# Patient Record
Sex: FEMALE | Race: WHITE | NOT HISPANIC OR LATINO | Employment: FULL TIME | URBAN - METROPOLITAN AREA
[De-identification: names, ages, dates, MRNs, and addresses within clinical notes are randomized per-mention and may not be internally consistent; named-entity substitution may affect disease eponyms.]

---

## 2017-02-10 DIAGNOSIS — G43.709 CHRONIC MIGRAINE WITHOUT AURA WITHOUT STATUS MIGRAINOSUS, NOT INTRACTABLE: ICD-10-CM

## 2017-02-10 RX ORDER — SUMATRIPTAN 50 MG/1
50 TABLET, FILM COATED ORAL
Qty: 9 TAB | Refills: 3 | Status: SHIPPED | OUTPATIENT
Start: 2017-02-10 | End: 2017-10-26 | Stop reason: SDUPTHER

## 2017-02-10 RX ORDER — SUMATRIPTAN 50 MG/1
TABLET, FILM COATED ORAL
Qty: 9 TAB | Refills: 0 | OUTPATIENT
Start: 2017-02-10

## 2017-02-10 NOTE — TELEPHONE ENCOUNTER
Please inform patient that Imitrex was refilled for her today. I have never seen the patient before she needs to establish care with PCP to continue management of prescription.    Thanks!    Georgie Ashley M.D.

## 2017-02-10 NOTE — Clinical Note
February 15, 2017        Lilian Nielson  [address] [place] [address]        Dear Lilian:    Your medication, Imitrex was refilled for you today. Dr. Ashley has never seen you before she would like you to establish care with PCP to continue management of prescription.   Thanks!            If you have any questions or concerns, please don't hesitate to call.        Sincerely,        Amanuel Watts Ass't      Electronically Signed

## 2017-02-10 NOTE — TELEPHONE ENCOUNTER
Was the patient seen in the last year in this department? No     Does patient have an active prescription for medications requested? No     Received Request Via: Pharmacy

## 2017-02-13 NOTE — TELEPHONE ENCOUNTER
Phone Number Called: [phone] (home)     Message: Left message for the patient to call us back regarding the note below.    Left Message for patient to call back: yes

## 2017-02-14 NOTE — TELEPHONE ENCOUNTER
Phone Number Called: [phone] (home)     Message: Left message for the patient to call us back regarding the note below.    Left Message for patient to call back: yes

## 2017-02-15 NOTE — TELEPHONE ENCOUNTER
Phone Number Called: [phone] (home)     Message: Left message for the patient to call us back regarding the note below. Letter mailed out to the patient.    Left Message for patient to call back: yes

## 2017-08-09 ENCOUNTER — HOSPITAL ENCOUNTER (OUTPATIENT)
Facility: MEDICAL CENTER | Age: 47
End: 2017-08-09
Payer: COMMERCIAL

## 2017-08-09 LAB
BDY FAT % MEASURED: 15.5 %
BP DIAS: 62 MMHG
BP SYS: 100 MMHG
CHOLEST SERPL-MCNC: 167 MG/DL (ref 100–199)
DIABETES HTDIA: NO
EVENT NAME HTEVT: NORMAL
FASTING HTFAS: YES
GLUCOSE SERPL-MCNC: 71 MG/DL (ref 65–99)
HDLC SERPL-MCNC: 75 MG/DL
HYPERTENSION HTHYP: NO
LDLC SERPL CALC-MCNC: 80 MG/DL
SCREENING LOC CITY HTCIT: NORMAL
SCREENING LOC STATE HTSTA: NORMAL
SCREENING LOCATION HTLOC: NORMAL
SMOKING HTSMO: NO
SUBSCRIBER ID HTSID: NORMAL
TRIGL SERPL-MCNC: 62 MG/DL (ref 0–149)

## 2017-08-09 PROCEDURE — 80061 LIPID PANEL: CPT

## 2017-08-09 PROCEDURE — S5190 WELLNESS ASSESSMENT BY NONPH: HCPCS

## 2017-08-09 PROCEDURE — 82947 ASSAY GLUCOSE BLOOD QUANT: CPT

## 2017-09-18 DIAGNOSIS — F33.42 MAJOR DEPRESSIVE DISORDER, RECURRENT, IN FULL REMISSION (HCC): ICD-10-CM

## 2017-09-19 RX ORDER — FLUOXETINE HYDROCHLORIDE 40 MG/1
CAPSULE ORAL
Qty: 90 CAP | Refills: 0 | OUTPATIENT
Start: 2017-09-19

## 2017-09-19 NOTE — TELEPHONE ENCOUNTER
I called the Pharmacy [facility] advising pharmacist that patient is not under the care of Dr Ashley and to make sure prescription was denied, I called patient at [phone], advising her about the prescription being denied and she requested an appointment with Dr Ashley so I scheduled her.

## 2017-09-19 NOTE — TELEPHONE ENCOUNTER
I have never been seen this patient before. Patient needs to follow with her PCP or establish care with new PCP for medication refill. Refill declined.    Thanks!  Georgie Ashley M.D.

## 2017-10-07 ENCOUNTER — IMMUNIZATION (OUTPATIENT)
Dept: OCCUPATIONAL MEDICINE | Facility: CLINIC | Age: 47
End: 2017-10-07

## 2017-10-07 DIAGNOSIS — Z23 NEED FOR VACCINATION: ICD-10-CM

## 2017-10-07 PROCEDURE — 90686 IIV4 VACC NO PRSV 0.5 ML IM: CPT | Performed by: PREVENTIVE MEDICINE

## 2017-10-26 ENCOUNTER — OFFICE VISIT (OUTPATIENT)
Dept: MEDICAL GROUP | Age: 47
End: 2017-10-26
Payer: COMMERCIAL

## 2017-10-26 VITALS
WEIGHT: 100.4 LBS | BODY MASS INDEX: 14.87 KG/M2 | DIASTOLIC BLOOD PRESSURE: 74 MMHG | HEIGHT: 69 IN | TEMPERATURE: 98.2 F | HEART RATE: 100 BPM | SYSTOLIC BLOOD PRESSURE: 98 MMHG | OXYGEN SATURATION: 99 %

## 2017-10-26 DIAGNOSIS — F33.42 MAJOR DEPRESSIVE DISORDER, RECURRENT, IN FULL REMISSION (HCC): ICD-10-CM

## 2017-10-26 DIAGNOSIS — Z12.31 VISIT FOR SCREENING MAMMOGRAM: ICD-10-CM

## 2017-10-26 DIAGNOSIS — G43.709 CHRONIC MIGRAINE WITHOUT AURA WITHOUT STATUS MIGRAINOSUS, NOT INTRACTABLE: ICD-10-CM

## 2017-10-26 DIAGNOSIS — M62.838 NECK MUSCLE SPASM: ICD-10-CM

## 2017-10-26 PROCEDURE — 99214 OFFICE O/P EST MOD 30 MIN: CPT | Performed by: INTERNAL MEDICINE

## 2017-10-26 RX ORDER — SUMATRIPTAN 50 MG/1
50 TABLET, FILM COATED ORAL
Qty: 10 TAB | Refills: 3 | Status: SHIPPED | OUTPATIENT
Start: 2017-10-26 | End: 2018-07-22 | Stop reason: SDUPTHER

## 2017-10-26 RX ORDER — FLUOXETINE HYDROCHLORIDE 40 MG/1
40 CAPSULE ORAL
Qty: 90 CAP | Refills: 3 | Status: SHIPPED | OUTPATIENT
Start: 2017-10-26 | End: 2018-10-18 | Stop reason: SDUPTHER

## 2017-10-26 ASSESSMENT — PAIN SCALES - GENERAL: PAINLEVEL: NO PAIN

## 2017-10-27 NOTE — ASSESSMENT & PLAN NOTE
Patient has migraine since teenage. She has nausea and has sense of bad smell when she has migraine. She has migraine once or twice a month, which is well controlled with sumatriptan one dose or 2 doses the most. She denies side effects from taking sumatriptan. She needs to refill medication.

## 2017-10-27 NOTE — ASSESSMENT & PLAN NOTE
Patient stated that she was diagnosed with depression for many years. She was treated with Prozac for about 5 years. She tried to stop Prozac and had recurrent symptoms. She is taking Prozac 40 mg every evening. She denies side effects from taking Prozac and denies suicidal ideation or plan. She wants to refill Prozac today.

## 2017-10-27 NOTE — ASSESSMENT & PLAN NOTE
Patient reported that she sometimes has tension headache and headache and radiated to back of the neck and shoulders. Prior PCP prescribed Robaxin and Motrin for her to take for muscle spasm and tension headache. She does not require to take Robaxin a lot. She still has Robaxin at home. She needs to use computer a lot at work.

## 2017-10-27 NOTE — PROGRESS NOTES
Lilian Nielson is a 46 y.o. female here to establish care and the evaluation and management of:      HPI:    Major depressive disorder, recurrent, in full remission  Patient stated that she was diagnosed with depression for many years. She was treated with Prozac for about 5 years. She tried to stop Prozac and had recurrent symptoms. She is taking Prozac 40 mg every evening. She denies side effects from taking Prozac and denies suicidal ideation or plan. She wants to refill Prozac today.    Chronic migraine without aura without status migrainosus, not intractable  Patient has migraine since teenage. She has nausea and has sense of bad smell when she has migraine. She has migraine once or twice a month, which is well controlled with sumatriptan one dose or 2 doses the most. She denies side effects from taking sumatriptan. She needs to refill medication.    Neck muscle spasm  Patient reported that she sometimes has tension headache and headache and radiated to back of the neck and shoulders. Prior PCP prescribed Robaxin and Motrin for her to take for muscle spasm and tension headache. She does not require to take Robaxin a lot. She still has Robaxin at home. She needs to use computer a lot at work.    Current medicines (including changes today)  Current Outpatient Prescriptions   Medication Sig Dispense Refill   • fluoxetine (PROZAC) 40 MG capsule Take 1 Cap by mouth every day. 90 Cap 3   • sumatriptan (IMITREX) 50 MG Tab Take 1 Tab by mouth Once PRN for Migraine for up to 1 dose. 10 Tab 3   • methocarbamol (ROBAXIN) 500 MG Tab Take 1 Tab by mouth 3 times a day. Tie prn muscle tension. 90 Tab 0     No current facility-administered medications for this visit.      She  has a past medical history of Amenorrhea and Migraine (2011). She also has no past medical history of Breast cancer (CMS-[facility]) or Cancer (CMS-[facility]).  She  has a past surgical history that includes tonsillectomy (1971).  Social History   Substance Use  "Topics   • Smoking status: Never Smoker   • Smokeless tobacco: Never Used   • Alcohol use No     Social History     Social History Narrative   • No narrative on file     Family History   Problem Relation Age of Onset   • Cancer Mother      breast/  2009   • Diabetes Mother    • Heart Disease Father    • Heart Disease Maternal Grandmother    • Heart Disease Maternal Grandfather      Family Status   Relation Status   • Mother Alive    depression, migraine, obesity   • Brother Alive    severe allergies   • Father Alive   • Maternal Grandmother    • Maternal Grandfather    • Paternal Grandmother    • Paternal Grandfather      Health Maintenance Topics with due status: Overdue       Topic Date Due    MAMMOGRAM 2014         ROS    Gen.: Denied weight change, appetite change, fatigue.  ENT: Denied sinus tenderness, nasal congestion, runny nose, or sore throat  CVS: Denied chest pain, palpitations, legs swelling.  Respiratory: Denied cough, shortness of breath, wheezing.  GI: Denied abdominal pain, constipation or diarrhea.  Endocrine: Denied temperature intolerance, increased frequency of urination, polyphagia or polydipsia.  Musculoskeletal: Denied back pain or joint pain.    All other systems reviewed and are negative     Objective:     Blood pressure (!) 98/74, pulse 100, temperature 36.8 °C (98.2 °F), height 1.753 m (5' 9\"), weight 45.5 kg (100 lb 6.4 oz), SpO2 99 %, not currently breastfeeding. Body mass index is 14.83 kg/m².  Physical Exam:    Constitutional: Well nourished and Well developed, Alert, no distress.  Skin: Warm, dry, good turgor, no rashes in visible areas.  Eye: Equal, round and reactive, conjunctiva clear, lids normal.  ENMT: Lips without lesions, good dentition, oropharynx clear.  Neck: Trachea midline, no masses, no thyromegaly. No cervical or supraclavicular lymphadenopathy.  Respiratory: Unlabored respiratory effort, lungs clear to auscultation, no wheezes, no " scottyoscar.  Cardiovascular: Normal S1, S2, no murmur, no edema.  Abdomen: Soft, non distended, non-tender, no masses, no hepatosplenomegaly. Bowel sound normal.  Extremities: No edema, no clubbing, no cyanosis.  Psych: Alert and oriented x3, normal affect and mood.      Assessment and Plan:   The following treatment plan was discussed       1. Major depressive disorder, recurrent, in full remission (CMS-HCC)  - Well-controlled. Continue Prozac 40 mg daily. We discussed potentially decrease the dose of Prozac in future. Patient wants to keep present with the same dose currently. Refill medication today.  - Discussed with patient regarding the use and side effects of medication as well as black box warning of suicidality risk with medication. Patient verbally understands. Recommend to call 911 or go to ER if patient has suicidal ideation or plan.   - fluoxetine (PROZAC) 40 MG capsule; Take 1 Cap by mouth every day.  Dispense: 90 Cap; Refill: 3  - CBC WITH DIFFERENTIAL; Future  - COMP METABOLIC PANEL; Future    2. Chronic migraine without aura without status migrainosus, not intractable  - Well-controlled. Continue Imitrex when necessary. Review potential side effects of Imitrex with patient.  - sumatriptan (IMITREX) 50 MG Tab; Take 1 Tab by mouth Once PRN for Migraine for up to 1 dose.  Dispense: 10 Tab; Refill: 3  - CBC WITH DIFFERENTIAL; Future  - COMP METABOLIC PANEL; Future    3. Neck muscle spasm  - Discussed to do stretching neck exercise regularly and try heating pad treatment.   - Recommend to use Robaxin rarely. Review potential side effects of Robaxin with patient.    4. Visit for screening mammogram  - Patient is overdue for mammogram. Ordered screening mammogram  - MA-SCREEN MAMMO W/CAD-BILAT; Future    5. Hypotension  - Asymptomatic. Patient stated that her blood pressure has been low all the time. She does not feel any tired, fatigue or dizzy. Advised to keep hydrated well and monitor blood pressure and  pulse at home.    Records requested.  Followup: Return in about 6 months (around 4/26/2018), or if symptoms worsen or fail to improve, for annual physical exam. sooner should new symptoms or problems arise.      Please note that this dictation was created using voice recognition software. I have made every reasonable attempt to correct obvious errors, but I expect that there may have unintended errors in text, spelling, punctuation, or grammar that I did not discover.

## 2018-02-06 ENCOUNTER — APPOINTMENT (OUTPATIENT)
Dept: RADIOLOGY | Facility: MEDICAL CENTER | Age: 48
End: 2018-02-06
Attending: INTERNAL MEDICINE
Payer: COMMERCIAL

## 2018-02-07 ENCOUNTER — HOSPITAL ENCOUNTER (OUTPATIENT)
Dept: RADIOLOGY | Facility: MEDICAL CENTER | Age: 48
End: 2018-02-07
Attending: INTERNAL MEDICINE
Payer: COMMERCIAL

## 2018-02-07 DIAGNOSIS — Z12.31 VISIT FOR SCREENING MAMMOGRAM: ICD-10-CM

## 2018-02-07 PROCEDURE — 77067 SCR MAMMO BI INCL CAD: CPT

## 2018-04-07 ENCOUNTER — HOSPITAL ENCOUNTER (OUTPATIENT)
Dept: LAB | Facility: MEDICAL CENTER | Age: 48
End: 2018-04-07
Payer: COMMERCIAL

## 2018-04-07 ENCOUNTER — TELEPHONE (OUTPATIENT)
Dept: MEDICAL GROUP | Facility: MEDICAL CENTER | Age: 48
End: 2018-04-07

## 2018-04-07 ENCOUNTER — HOSPITAL ENCOUNTER (OUTPATIENT)
Dept: LAB | Facility: MEDICAL CENTER | Age: 48
End: 2018-04-07
Attending: INTERNAL MEDICINE
Payer: COMMERCIAL

## 2018-04-07 DIAGNOSIS — F33.42 MAJOR DEPRESSIVE DISORDER, RECURRENT, IN FULL REMISSION (HCC): ICD-10-CM

## 2018-04-07 DIAGNOSIS — G43.709 CHRONIC MIGRAINE WITHOUT AURA WITHOUT STATUS MIGRAINOSUS, NOT INTRACTABLE: ICD-10-CM

## 2018-04-07 DIAGNOSIS — E87.6 HYPOKALEMIA: ICD-10-CM

## 2018-04-07 LAB
ALBUMIN SERPL BCP-MCNC: 4.1 G/DL (ref 3.2–4.9)
ALBUMIN/GLOB SERPL: 1.6 G/DL
ALP SERPL-CCNC: 52 U/L (ref 30–99)
ALT SERPL-CCNC: 16 U/L (ref 2–50)
ANION GAP SERPL CALC-SCNC: 10 MMOL/L (ref 0–11.9)
AST SERPL-CCNC: 23 U/L (ref 12–45)
BASOPHILS # BLD AUTO: 2.4 % (ref 0–1.8)
BASOPHILS # BLD: 0.16 K/UL (ref 0–0.12)
BDY FAT % MEASURED: 13.5 %
BILIRUB SERPL-MCNC: 0.2 MG/DL (ref 0.1–1.5)
BP DIAS: 46 MMHG
BP SYS: 93 MMHG
BUN SERPL-MCNC: 30 MG/DL (ref 8–22)
CALCIUM SERPL-MCNC: 9 MG/DL (ref 8.5–10.5)
CHLORIDE SERPL-SCNC: 103 MMOL/L (ref 96–112)
CHOLEST SERPL-MCNC: 202 MG/DL (ref 100–199)
CO2 SERPL-SCNC: 28 MMOL/L (ref 20–33)
CREAT SERPL-MCNC: 1.75 MG/DL (ref 0.5–1.4)
DIABETES HTDIA: NO
EOSINOPHIL # BLD AUTO: 1.09 K/UL (ref 0–0.51)
EOSINOPHIL NFR BLD: 16.4 % (ref 0–6.9)
ERYTHROCYTE [DISTWIDTH] IN BLOOD BY AUTOMATED COUNT: 49.7 FL (ref 35.9–50)
EVENT NAME HTEVT: NORMAL
FASTING HTFAS: YES
GLOBULIN SER CALC-MCNC: 2.5 G/DL (ref 1.9–3.5)
GLUCOSE SERPL-MCNC: 81 MG/DL (ref 65–99)
GLUCOSE SERPL-MCNC: 83 MG/DL (ref 65–99)
HCT VFR BLD AUTO: 38.7 % (ref 37–47)
HDLC SERPL-MCNC: 81 MG/DL
HGB BLD-MCNC: 11.9 G/DL (ref 12–16)
HYPERTENSION HTHYP: NO
IMM GRANULOCYTES # BLD AUTO: 0.02 K/UL (ref 0–0.11)
IMM GRANULOCYTES NFR BLD AUTO: 0.3 % (ref 0–0.9)
LDLC SERPL CALC-MCNC: 106 MG/DL
LYMPHOCYTES # BLD AUTO: 1.25 K/UL (ref 1–4.8)
LYMPHOCYTES NFR BLD: 18.9 % (ref 22–41)
MCH RBC QN AUTO: 25.6 PG (ref 27–33)
MCHC RBC AUTO-ENTMCNC: 30.7 G/DL (ref 33.6–35)
MCV RBC AUTO: 83.4 FL (ref 81.4–97.8)
MONOCYTES # BLD AUTO: 0.42 K/UL (ref 0–0.85)
MONOCYTES NFR BLD AUTO: 6.3 % (ref 0–13.4)
NEUTROPHILS # BLD AUTO: 3.69 K/UL (ref 2–7.15)
NEUTROPHILS NFR BLD: 55.7 % (ref 44–72)
NRBC # BLD AUTO: 0 K/UL
NRBC BLD-RTO: 0 /100 WBC
PLATELET # BLD AUTO: 389 K/UL (ref 164–446)
PMV BLD AUTO: 8.9 FL (ref 9–12.9)
POTASSIUM SERPL-SCNC: 2.4 MMOL/L (ref 3.6–5.5)
PROT SERPL-MCNC: 6.6 G/DL (ref 6–8.2)
RBC # BLD AUTO: 4.64 M/UL (ref 4.2–5.4)
SCREENING LOC CITY HTCIT: NORMAL
SCREENING LOC STATE HTSTA: NORMAL
SCREENING LOCATION HTLOC: NORMAL
SMOKING HTSMO: NO
SODIUM SERPL-SCNC: 141 MMOL/L (ref 135–145)
SUBSCRIBER ID HTSID: NORMAL
TRIGL SERPL-MCNC: 73 MG/DL (ref 0–149)
WBC # BLD AUTO: 6.6 K/UL (ref 4.8–10.8)

## 2018-04-07 PROCEDURE — 80061 LIPID PANEL: CPT

## 2018-04-07 PROCEDURE — 85025 COMPLETE CBC W/AUTO DIFF WBC: CPT

## 2018-04-07 PROCEDURE — 36415 COLL VENOUS BLD VENIPUNCTURE: CPT

## 2018-04-07 PROCEDURE — 82947 ASSAY GLUCOSE BLOOD QUANT: CPT

## 2018-04-07 PROCEDURE — 80053 COMPREHEN METABOLIC PANEL: CPT

## 2018-04-07 PROCEDURE — S5190 WELLNESS ASSESSMENT BY NONPH: HCPCS

## 2018-04-07 RX ORDER — POTASSIUM CHLORIDE 20 MEQ/1
TABLET, EXTENDED RELEASE ORAL
Qty: 30 TAB | Refills: 0 | Status: SHIPPED | OUTPATIENT
Start: 2018-04-07 | End: 2018-04-26 | Stop reason: SDUPTHER

## 2018-04-08 NOTE — TELEPHONE ENCOUNTER
Please call and inform patient to take potassium supplement 20 meq 3 times a day as prescribed by Dr. Chappell on 4/7/18 who did cross-covering for pager.   Please advise patient to drink more water about 60 ounces a day and avoid taking NSAIDs such as ibuprofen, aleve, advil, naproxen, motrin and avoid alcohol as her kidney function is decreasing.   Please advise to eat potassium rich diet such as bananas, potatoes, squash, oranges.     I ordered basal metabolic panel to recheck potassium and kidney function for patient to follow up on potassium level and kidney function. Please advise her to do fasting lab on 4/20/18 so that I can review her blood test with her in her upcoming appointment on 4/26/18.    Thanks!  Georgie Ashley M.D.

## 2018-04-08 NOTE — TELEPHONE ENCOUNTER
Patient is called for her low potassium ( 2.4). First call at home no response. Second call at her cell phone no response.. Third call at home phone patient responded. Patient denies any symptoms ( no muscle spasm, but she has been feeling a little bit weak and  tired , but no palpitation, or SOB) , no previous K level to compare. I informed her that, the K level is critically low and need a fast replacement . Advised to go to Er for IV Potassium. Will follow up with potassium level, after discharge. Advised to follow up with PCP for evaluation the cause of her hypokalemia, and CKD ( eGFR 31, Cr 1.75). Will route this note to PCP.

## 2018-04-09 NOTE — TELEPHONE ENCOUNTER
Phone Number Called: [phone] (cell)  [phone] (home)         Left Message for patient to call back: yes

## 2018-04-10 NOTE — TELEPHONE ENCOUNTER
1. Caller Name: Lilian Nielson                                           Call Back Number: [phone] (home)       Reviewed note below with pt. She agrees that she understands all drs orders,  Asks what should she take if she gets a migraine, as she is currently controlling them with NSAIDS?        Patient approves a detailed voicemail message: yes

## 2018-04-10 NOTE — TELEPHONE ENCOUNTER
Please advise to take Tylenol for headache. She can take Imitrex 50 mg one tablet one time as needed for migraine if headache does not improve with Tylenol. We can discuss migraine in her follow-up appointment on 4/26/18.    She has to completely stopped taking ibuprofen or any NSAIDs.    Thanks!  Georgie Ashley M.D.

## 2018-04-10 NOTE — TELEPHONE ENCOUNTER
1. Caller Name: Lilian Nielson                                           Call Back Number: [phone] (home)      Pt informed  And understands,          Patient approves a detailed voicemail message: N\A

## 2018-04-19 ENCOUNTER — HOSPITAL ENCOUNTER (OUTPATIENT)
Dept: LAB | Facility: MEDICAL CENTER | Age: 48
End: 2018-04-19
Attending: INTERNAL MEDICINE
Payer: COMMERCIAL

## 2018-04-19 ENCOUNTER — TELEPHONE (OUTPATIENT)
Dept: MEDICAL GROUP | Age: 48
End: 2018-04-19

## 2018-04-19 DIAGNOSIS — E87.6 HYPOKALEMIA: ICD-10-CM

## 2018-04-19 DIAGNOSIS — N28.9 RENAL IMPAIRMENT: ICD-10-CM

## 2018-04-19 DIAGNOSIS — D64.9 NORMOCYTIC ANEMIA: ICD-10-CM

## 2018-04-19 LAB
ALBUMIN SERPL BCP-MCNC: 4 G/DL (ref 3.2–4.9)
ALBUMIN/GLOB SERPL: 1.5 G/DL
ALP SERPL-CCNC: 49 U/L (ref 30–99)
ALT SERPL-CCNC: 16 U/L (ref 2–50)
ANION GAP SERPL CALC-SCNC: 11 MMOL/L (ref 0–11.9)
AST SERPL-CCNC: 22 U/L (ref 12–45)
BILIRUB SERPL-MCNC: 0.2 MG/DL (ref 0.1–1.5)
BUN SERPL-MCNC: 24 MG/DL (ref 8–22)
CALCIUM SERPL-MCNC: 8.9 MG/DL (ref 8.5–10.5)
CHLORIDE SERPL-SCNC: 102 MMOL/L (ref 96–112)
CO2 SERPL-SCNC: 28 MMOL/L (ref 20–33)
CREAT SERPL-MCNC: 1.57 MG/DL (ref 0.5–1.4)
FERRITIN SERPL-MCNC: 6 NG/ML (ref 10–291)
FOLATE SERPL-MCNC: >24 NG/ML
GLOBULIN SER CALC-MCNC: 2.7 G/DL (ref 1.9–3.5)
GLUCOSE SERPL-MCNC: 81 MG/DL (ref 65–99)
IRON SATN MFR SERPL: 5 % (ref 15–55)
IRON SERPL-MCNC: 25 UG/DL (ref 40–170)
POTASSIUM SERPL-SCNC: 2.3 MMOL/L (ref 3.6–5.5)
PROT SERPL-MCNC: 6.7 G/DL (ref 6–8.2)
SODIUM SERPL-SCNC: 141 MMOL/L (ref 135–145)
T4 FREE SERPL-MCNC: 0.63 NG/DL (ref 0.53–1.43)
TIBC SERPL-MCNC: 468 UG/DL (ref 250–450)
TSH SERPL DL<=0.005 MIU/L-ACNC: 1.97 UIU/ML (ref 0.38–5.33)
VIT B12 SERPL-MCNC: 640 PG/ML (ref 211–911)

## 2018-04-19 PROCEDURE — 83540 ASSAY OF IRON: CPT

## 2018-04-19 PROCEDURE — 82607 VITAMIN B-12: CPT

## 2018-04-19 PROCEDURE — 83550 IRON BINDING TEST: CPT

## 2018-04-19 PROCEDURE — 82728 ASSAY OF FERRITIN: CPT

## 2018-04-19 PROCEDURE — 84439 ASSAY OF FREE THYROXINE: CPT

## 2018-04-19 PROCEDURE — 82746 ASSAY OF FOLIC ACID SERUM: CPT

## 2018-04-19 PROCEDURE — 36415 COLL VENOUS BLD VENIPUNCTURE: CPT

## 2018-04-19 PROCEDURE — 80053 COMPREHEN METABOLIC PANEL: CPT

## 2018-04-19 PROCEDURE — 84443 ASSAY THYROID STIM HORMONE: CPT

## 2018-04-19 NOTE — TELEPHONE ENCOUNTER
I spoke with patient over the phone regarding her critical low potassium at 2.3. Patient has potassium 20 mEq to take 3 times a day. She stated that she did not take her potassium as instructed. She only took potassium, 1-2 days and she did not continue that. Patient was started taking potassium 20 mEq 3 times a day with meal. We also discussed to eat potassium rich diet. She has impaired kidney function. Patient stated that she is taking ibuprofen 600 mg twice a day for many years. I advised patient to completely stop all NSAIDs including ibuprofen. She is advised to take Tylenol only for headache or joint pain or muscle pain. Patient is advised to increase water intake up to 40-60 ounces a day.    She has low ferritin and slightly anemic. Patient stated that she has irregular menstrual period. She has menstruation every other month, last for 7-10 days and has moderate to heavy flow. She was told that she has iron deficiency in the past and was advised to take iron supplement. She did not take iron supplement for couple years. She denied blood in stool or abdominal pain or diarrhea or nausea or vomiting.    I advised patient to start ferrous sulfate 325 mg one tablet twice a day with meal. I also reviewed potential side effects of iron supplements. I advised patient to increase fiber intake and water intake to prevent constipation. She can try stool softener from over-the-counter if she has constipation.    Patient has follow-up appointment with me on 4/26/18.    Thanks!  Georgie Ashley M.D.

## 2018-04-19 NOTE — TELEPHONE ENCOUNTER
1. Caller Name: Tricia Martinez lab at [facility]                                         Call Back Number: N/A        Critical Results: Potassium 2.3    Main lab at [facility] wants to inform you that patients potassium is 2.3.

## 2018-04-19 NOTE — TELEPHONE ENCOUNTER
Patient presented to clinic to request lab order. Her recent blood tests on 4/7/18 showed she has normocytic anemia, low potassium at 2.4, and impaired kidney function. She is taking potassium 20 mEq one tablet daily instructed by on-call provider, Dr. Chappell. She will need to recheck a potassium level and kidney function.  I order CMP to repeat potassium and kidney function I also order iron panel, B12, folate, thyroid, for anemia workup. Patient is advised not to take ibuprofen, Aleve, Advil or any NSAIDs from over-the-counter.        Georgie Ashley M.D.

## 2018-04-26 ENCOUNTER — OFFICE VISIT (OUTPATIENT)
Dept: MEDICAL GROUP | Age: 48
End: 2018-04-26
Payer: COMMERCIAL

## 2018-04-26 VITALS
RESPIRATION RATE: 16 BRPM | SYSTOLIC BLOOD PRESSURE: 100 MMHG | TEMPERATURE: 97.8 F | OXYGEN SATURATION: 91 % | HEART RATE: 72 BPM | WEIGHT: 100 LBS | BODY MASS INDEX: 14.81 KG/M2 | DIASTOLIC BLOOD PRESSURE: 64 MMHG | HEIGHT: 69 IN

## 2018-04-26 DIAGNOSIS — N18.30 CKD (CHRONIC KIDNEY DISEASE) STAGE 3, GFR 30-59 ML/MIN (HCC): ICD-10-CM

## 2018-04-26 DIAGNOSIS — E87.6 HYPOKALEMIA: ICD-10-CM

## 2018-04-26 DIAGNOSIS — R63.6 UNDERWEIGHT DUE TO INADEQUATE CALORIC INTAKE: ICD-10-CM

## 2018-04-26 DIAGNOSIS — E61.1 IRON DEFICIENCY: ICD-10-CM

## 2018-04-26 DIAGNOSIS — G43.709 CHRONIC MIGRAINE WITHOUT AURA WITHOUT STATUS MIGRAINOSUS, NOT INTRACTABLE: ICD-10-CM

## 2018-04-26 DIAGNOSIS — D64.9 NORMOCYTIC ANEMIA: ICD-10-CM

## 2018-04-26 PROCEDURE — 99215 OFFICE O/P EST HI 40 MIN: CPT | Performed by: INTERNAL MEDICINE

## 2018-04-26 RX ORDER — POTASSIUM CHLORIDE 20 MEQ/1
20 TABLET, EXTENDED RELEASE ORAL 2 TIMES DAILY
Qty: 60 TAB | Refills: 6 | Status: SHIPPED | OUTPATIENT
Start: 2018-04-26 | End: 2019-08-15 | Stop reason: SDUPTHER

## 2018-04-26 ASSESSMENT — PATIENT HEALTH QUESTIONNAIRE - PHQ9: CLINICAL INTERPRETATION OF PHQ2 SCORE: 0

## 2018-04-26 NOTE — ASSESSMENT & PLAN NOTE
Patient states that she does not have severe cerebral headache after stopped taking Excedrin Migraine and ibuprofen. She is still feeling fatigued and tired. We discussed to completely stop NSAIDs and Excedrin Migraine. She can try  1-2 cup regular coffee per day. Patient is advised not to drink any tea due to low iron. Patient will take Imitrex as needed for migraine.

## 2018-04-26 NOTE — ASSESSMENT & PLAN NOTE
Patient did not have complete metabolic panel until April 2018. She was found to have venous vision impairment with elevated creatinine and low GFR. Patient admitted that she depended on caffeine from taking Excedrin migraine medication 2 tablets 3 times a day and ibuprofen 800 mg daily for many years. After she knows about her kidney function impairment, she stopped taking all NSAIDs, Excedrin in the past 2 weeks. She stated that she does not like to drink coffee as she becomes dependence on caffeine from taking Excedrin.

## 2018-04-26 NOTE — ASSESSMENT & PLAN NOTE
Patient stated that she has irregular menstrual cycle when she has menstruation for 7 days with minimal flow or spotting only. She does not likes to eat meat but she is okay to eat chicken. She is okay to eat vegetables.  She drinks a lot of tea throughout the day. Patient is advised to stop drinking tea.. She denied chest pain, palpitation, shortness of breath. However, she feels tired most of the time. CBC on 4/17/18 show hemoglobin 11.9. Her ferritin level was 6. . She has normal B12, folate, thyroid hormone test.    Results for JODEE PRIETO (MRN [id number]) as of 4/26/2018 09:22   Ref. Range 4/19/2018 08:31   Iron Latest Ref Range: 40 - 170 ug/dL 25 (L)   Total Iron Binding Latest Ref Range: 250 - 450 ug/dL 468 (H)   % Saturation Latest Ref Range: 15 - 55 % 5 (L)     Results for JODEE PRIETO (MRN [id number]) as of 4/26/2018 09:22   Ref. Range 4/19/2018 08:31   Vitamin B12 -True Cobalamin Latest Ref Range: 211 - 911 pg/mL 640   Ferritin Latest Ref Range: 10.0 - 291.0 ng/mL 6.0 (L)   Folate -Folic Acid Latest Ref Range: >4.0 ng/mL >24.0   TSH Latest Ref Range: 0.380 - 5.330 uIU/mL 1.970   Free T-4 Latest Ref Range: 0.53 - 1.43 ng/dL 0.63

## 2018-04-26 NOTE — PROGRESS NOTES
Subjective:   Jodee Nielson is a 47 y.o. female here today for evaluation and management of:      CKD (chronic kidney disease) stage 3, GFR 30-59 ml/min  Patient did not have complete metabolic panel until April 2018. She was found to have venous vision impairment with elevated creatinine and low GFR. Patient admitted that she depended on caffeine from taking Excedrin migraine medication 2 tablets 3 times a day and ibuprofen 800 mg daily for many years. After she knows about her kidney function impairment, she stopped taking all NSAIDs, Excedrin in the past 2 weeks. She stated that she does not like to drink coffee as she becomes dependence on caffeine from taking Excedrin.     Chronic migraine without aura without status migrainosus, not intractable  Patient states that she does not have severe cerebral headache after stopped taking Excedrin Migraine and ibuprofen. She is still feeling fatigued and tired. We discussed to completely stop NSAIDs and Excedrin Migraine. She can try  1-2 cup regular coffee per day. Patient is advised not to drink any tea due to low iron. Patient will take Imitrex as needed for migraine.    Iron deficiency  Patient stated that she has irregular menstrual cycle when she has menstruation for 7 days with minimal flow or spotting only. She does not likes to eat meat but she is okay to eat chicken. She is okay to eat vegetables.  She drinks a lot of tea throughout the day. Patient is advised to stop drinking tea.. She denied chest pain, palpitation, shortness of breath. However, she feels tired most of the time. CBC on 4/17/18 show hemoglobin 11.9. Her ferritin level was 6. . She has normal B12, folate, thyroid hormone test.    Results for JODEE NIELSON (MRN [id number]) as of 4/26/2018 09:22   Ref. Range 4/19/2018 08:31   Iron Latest Ref Range: 40 - 170 ug/dL 25 (L)   Total Iron Binding Latest Ref Range: 250 - 450 ug/dL 468 (H)   % Saturation Latest Ref Range: 15 - 55 % 5 (L)     Results for  IDAJODEE (MRN [id number]) as of 4/26/2018 09:22   Ref. Range 4/19/2018 08:31   Vitamin B12 -True Cobalamin Latest Ref Range: 211 - 911 pg/mL 640   Ferritin Latest Ref Range: 10.0 - 291.0 ng/mL 6.0 (L)   Folate -Folic Acid Latest Ref Range: >4.0 ng/mL >24.0   TSH Latest Ref Range: 0.380 - 5.330 uIU/mL 1.970   Free T-4 Latest Ref Range: 0.53 - 1.43 ng/dL 0.63       Underweight due to inadequate caloric intake  Patient has history of anorexia nervosa. She stated that she is not intentionally to avoid eating or induced vomiting. However, she does not like to eat heavy meal or eat meats. She stated that she will have running exercises once or twice a week, about one mile only with her daughter. She is not doing exercises exercise currently. She reported that she has busy schedule at work and has 2 kids, the one in high school and the one in middle school. She is very busy to take care of her two kids including preparing meal, transportation for their school sports and activities. She is not able to eat well when she is busy and she feels anxious and nauseous when she forces herself to eat.         Current medicines (including changes today)  Current Outpatient Prescriptions   Medication Sig Dispense Refill   • Ferrous Fumarate 325 (106 Fe) MG Tab Take 1 Tab by mouth 2 times a day, with meals. 60 Tab 6   • potassium chloride SA (KDUR) 20 MEQ Tab CR Take 1 Tab by mouth 2 times a day. 60 Tab 6   • fluoxetine (PROZAC) 40 MG capsule Take 1 Cap by mouth every day. 90 Cap 3   • sumatriptan (IMITREX) 50 MG Tab Take 1 Tab by mouth Once PRN for Migraine for up to 1 dose. 10 Tab 3   • methocarbamol (ROBAXIN) 500 MG Tab Take 1 Tab by mouth 3 times a day. Tie prn muscle tension. 90 Tab 0     No current facility-administered medications for this visit.      She  has a past medical history of Amenorrhea and Migraine (2011). She also has no past medical history of Breast cancer (CMS-HCC) or Cancer (CMS-HCC).    ROS   No chest pain,  "no shortness of breath, no abdominal pain       Objective:     Blood pressure 100/64, pulse 72, temperature 36.6 °C (97.8 °F), resp. rate 16, height 1.753 m (5' 9\"), weight 45.4 kg (100 lb), SpO2 91 %. Body mass index is 14.77 kg/m².   Physical Exam:  General: Alert, oriented and no acute distress.  Eye contact is good, speech goal directed, affect calm  HEENT: conjunctiva non-injected, sclera non-icteric.  Oral mucous membranes pink and moist with no lesions.  Pinna normal.   Lungs: Normal respiratory effort, clear to auscultation bilaterally with good excursion.  CV: regular rate and rhythm. No murmurs.   Abdomen: soft, non distended, nontender, No CVAT, Bowel sound normal.  Ext: no edema, color normal, vascularity normal, temperature normal        Assessment and Plan:   The following treatment plan was discussed     1. Chronic migraine without aura without status migrainosus, not intractable  - Patient is advised to take Imitrex as needed for migraine. We discussed to completely stop taking ibuprofen or any NSAIDs or Excedrin Migraine. She may have 1-2 cups of coffee a day.  - COMP METABOLIC PANEL; Future  - BASIC METABOLIC PANEL; Future    2. Underweight due to inadequate caloric intake  - We discussed balanced nutrition, healthy diet. I advised patient to eat 3 meals regularly. She can start with small amount of meal and add snack in between meals.  - Refer to nutritionist for further evaluation and treatment.  - Discussed to avoid strenuous exercise. Patient is advised to keep active with regular physical activity or walking exercises.  - REFERRAL TO NEPHROLOGY  - REFERRAL TO NUTRITION SERVICES  - CBC WITH DIFFERENTIAL; Future  - COMP METABOLIC PANEL; Future    3. Iron deficiency  - Recommend to take ferrous 325 mg twice a day with meal. Patient is advised to eat fibers and drink more water to prevent constipation. She can add on Metamucil or Benefiber. We also discussed to eat plum or prune or prune juice.  - " Strongly recommend to avoid drinking tea.  - Reviewed potential side effects of Iron supplements with patient.  - Ferrous Fumarate 325 (106 Fe) MG Tab; Take 1 Tab by mouth 2 times a day, with meals.  Dispense: 60 Tab; Refill: 6  - REFERRAL TO NEPHROLOGY  - REFERRAL TO NUTRITION SERVICES  - CBC WITH DIFFERENTIAL; Future  - IRON/TOTAL IRON BIND; Future  - FERRITIN; Future    4. Hypokalemia  - Patient will continue potassium chloride 20 mEq twice a day. We also discussed potassium rich diet.  - Will recheck BMP to follow up on potassium and kidney in 4 weeks.  - potassium chloride SA (KDUR) 20 MEQ Tab CR; Take 1 Tab by mouth 2 times a day.  Dispense: 60 Tab; Refill: 6  - REFERRAL TO NEPHROLOGY  - REFERRAL TO NUTRITION SERVICES  - COMP METABOLIC PANEL; Future  - BASIC METABOLIC PANEL; Future    5. 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 Recommend to stop Excedrin and NSAIDs. Recommend to keep well hydrated. Patient stated she urinates regularly.  - Referred to nephrologist for further assessment and treatment.  - Will recheck BMP in 4 weeks  - REFERRAL TO NEPHROLOGY  - REFERRAL TO NUTRITION SERVICES    6. Normocytic anemia  - Advised to take iron supplements twice a day. Discussed balanced nutrition. We will recheck CBC, iron, B12, folate in 3 months.  - CBC WITH DIFFERENTIAL; Future  - IRON/TOTAL IRON BIND; Future  - FERRITIN; Future  - VITAMIN B12; Future  - FOLATE; Future    Face-to-face time spent 40 minutes with patient and more than half of that time spent for counseling and cooperating of care for medical problems listed above.         Followup: Return in about 3 months (around 7/26/2018), or if symptoms worsen or fail to improve, for impaired kidney function, iron deficiency, hypokalemia, underweight, lab review.      Please note that this dictation was created using voice recognition software. I have made every reasonable attempt to correct obvious  errors, but I expect that there may have unintended errors in text, spelling, punctuation, or grammar that I did not discover.

## 2018-04-26 NOTE — ASSESSMENT & PLAN NOTE
Patient has history of anorexia nervosa. She stated that she is not intentionally to avoid eating or induced vomiting. However, she does not like to eat heavy meal or eat meats. She stated that she will have running exercises once or twice a week, about one mile only with her daughter. She is not doing exercises exercise currently. She reported that she has busy schedule at work and has 2 kids, the one in high school and the one in middle school. She is very busy to take care of her two kids including preparing meal, transportation for their school sports and activities. She is not able to eat well when she is busy and she feels anxious and nauseous when she forces herself to eat.

## 2018-05-23 ENCOUNTER — HOSPITAL ENCOUNTER (OUTPATIENT)
Dept: LAB | Facility: MEDICAL CENTER | Age: 48
End: 2018-05-23
Attending: INTERNAL MEDICINE
Payer: COMMERCIAL

## 2018-05-23 DIAGNOSIS — G43.709 CHRONIC MIGRAINE WITHOUT AURA WITHOUT STATUS MIGRAINOSUS, NOT INTRACTABLE: ICD-10-CM

## 2018-05-23 DIAGNOSIS — E87.6 HYPOKALEMIA: ICD-10-CM

## 2018-05-23 LAB
ANION GAP SERPL CALC-SCNC: 6 MMOL/L (ref 0–11.9)
BUN SERPL-MCNC: 30 MG/DL (ref 8–22)
CALCIUM SERPL-MCNC: 9.2 MG/DL (ref 8.5–10.5)
CHLORIDE SERPL-SCNC: 105 MMOL/L (ref 96–112)
CO2 SERPL-SCNC: 28 MMOL/L (ref 20–33)
CREAT SERPL-MCNC: 1.23 MG/DL (ref 0.5–1.4)
GLUCOSE SERPL-MCNC: 73 MG/DL (ref 65–99)
POTASSIUM SERPL-SCNC: 3 MMOL/L (ref 3.6–5.5)
SODIUM SERPL-SCNC: 139 MMOL/L (ref 135–145)

## 2018-05-23 PROCEDURE — 36415 COLL VENOUS BLD VENIPUNCTURE: CPT

## 2018-05-23 PROCEDURE — 80048 BASIC METABOLIC PNL TOTAL CA: CPT

## 2018-05-24 ENCOUNTER — TELEPHONE (OUTPATIENT)
Dept: MEDICAL GROUP | Age: 48
End: 2018-05-24

## 2018-05-24 NOTE — TELEPHONE ENCOUNTER
----- Message from Georgie Ashley M.D. sent at 5/23/2018  7:02 PM PDT -----  Please inform patient that her recent blood tests showed kidney function improved but she still has low potassium. Please advise patient to increase the dose of potassium from 20 mEq one tablet twice a day to one tablet 3 times a day.  Please advise patient to keep well-hydrated and avoid taking ibuprofen avidly, Advil, naproxen.  Please advise patient to follow-up with nephrologist as scheduled on 5/30/18.

## 2018-05-24 NOTE — LETTER
May 31, 2018        Lilian Nielson        Our office has been unable to contact you, please contact our office at your earliest convenience at [phone].     Thank you

## 2018-05-24 NOTE — TELEPHONE ENCOUNTER
Phone Number Called: [phone] (home)       Message: unable to leave a msg to pt. Voice mail isn't set up yet. Well try again later    Left Message for patient to call back: no

## 2018-05-29 NOTE — TELEPHONE ENCOUNTER
Call Back Number: [phone] (home)         Patient approves a detailed voicemail message: N\A    unable to leave message voice3 mail not set up    2nd attempt

## 2018-05-30 ENCOUNTER — OFFICE VISIT (OUTPATIENT)
Dept: NEPHROLOGY | Facility: MEDICAL CENTER | Age: 48
End: 2018-05-30
Payer: COMMERCIAL

## 2018-05-30 VITALS
DIASTOLIC BLOOD PRESSURE: 58 MMHG | WEIGHT: 109 LBS | RESPIRATION RATE: 14 BRPM | HEART RATE: 72 BPM | TEMPERATURE: 98.7 F | HEIGHT: 71 IN | SYSTOLIC BLOOD PRESSURE: 100 MMHG | BODY MASS INDEX: 15.26 KG/M2 | OXYGEN SATURATION: 100 %

## 2018-05-30 DIAGNOSIS — E87.6 HYPOKALEMIA: ICD-10-CM

## 2018-05-30 DIAGNOSIS — E61.1 IRON DEFICIENCY: ICD-10-CM

## 2018-05-30 DIAGNOSIS — N17.9 AKI (ACUTE KIDNEY INJURY) (HCC): ICD-10-CM

## 2018-05-30 PROCEDURE — 99204 OFFICE O/P NEW MOD 45 MIN: CPT | Performed by: INTERNAL MEDICINE

## 2018-05-30 ASSESSMENT — ENCOUNTER SYMPTOMS
HEMOPTYSIS: 0
NECK PAIN: 0
SHORTNESS OF BREATH: 0
FEVER: 0
HEADACHES: 1
MYALGIAS: 0
COUGH: 0
CHILLS: 0
VOMITING: 0
NAUSEA: 0

## 2018-05-30 NOTE — PROGRESS NOTES
"Subjective:      Lilian Nielson is a 47 y.o. female who presents with Chronic Kidney Disease            The patient is a very pleasant 47-year-old lady, with past medical history of migraine headache, was taken NSAIDs regularly, was diagnosed with acute kidney injury, she stopped the NSAIDs and the creatinine is up to do better.  Patient also was having severe hypokalemia, is improving with potassium supplement.      Chronic Kidney Disease   This is a new problem. The current episode started more than 1 month ago. The problem occurs constantly. The problem has been waxing and waning. Associated symptoms include headaches. Pertinent negatives include no chest pain, chills, coughing, fever, myalgias, nausea, neck pain, urinary symptoms or vomiting. Exacerbated by: NSAIDs.       Review of Systems   Constitutional: Negative for chills and fever.   Respiratory: Negative for cough, hemoptysis and shortness of breath.    Cardiovascular: Negative for chest pain and leg swelling.   Gastrointestinal: Negative for nausea and vomiting.   Genitourinary: Negative for dysuria, frequency and urgency.   Musculoskeletal: Negative for myalgias and neck pain.   Neurological: Positive for headaches.   All other systems reviewed and are negative.         Objective:     /58   Pulse 72   Temp 37.1 °C (98.7 °F)   Resp 14   Ht 1.803 m (5' 11\")   Wt 49.4 kg (109 lb)   SpO2 100%   BMI 15.20 kg/m²      Physical Exam   Constitutional: She is oriented to person, place, and time. She appears well-developed and well-nourished. No distress.   HENT:   Head: Normocephalic and atraumatic.   Right Ear: External ear normal.   Left Ear: External ear normal.   Nose: Nose normal.   Mouth/Throat: No oropharyngeal exudate.   Eyes: Conjunctivae are normal. Right eye exhibits no discharge. Left eye exhibits no discharge. No scleral icterus.   Neck: No JVD present. No tracheal deviation present. No thyromegaly present.   Cardiovascular: Normal rate, " regular rhythm and normal heart sounds.    No murmur heard.  Pulmonary/Chest: Effort normal and breath sounds normal. No respiratory distress. She has no wheezes. She has no rales.   Musculoskeletal: She exhibits no edema or tenderness.   Neurological: She is alert and oriented to person, place, and time. No cranial nerve deficit.   Skin: Skin is warm and dry. She is not diaphoretic.   Psychiatric: She has a normal mood and affect. Her behavior is normal. Judgment and thought content normal.   Nursing note and vitals reviewed.              Assessment/Plan:     1. ADITYA (acute kidney injury) (HCC)  Etiology is most likely hemodynamic effect of NSAIDs, with prerenal component  Patient has no uremic symptoms  No acute need for dialysis  Patient was advised to continue to avoid NSAIDs  Recheck kidney function test after hydration  Check microfilmed to creatinine ratio    2. Hypokalemia  Continue potassium supplement  Check magnesium level    3. Iron deficiency  Continue iron supplements  Rule out GI bleed

## 2018-05-31 NOTE — TELEPHONE ENCOUNTER
Phone Number Called: [phone] (home)        Message: Unable to leave a message for patient in regards to previous message from provider. Will send out letter for patient to contact office. Will also send note to patient via my chart.     Left Message for patient to call back: N\A voicemail not set up

## 2018-06-06 DIAGNOSIS — G44.209 TENSION HEADACHE: ICD-10-CM

## 2018-06-08 RX ORDER — METHOCARBAMOL 500 MG/1
500 TABLET, FILM COATED ORAL 3 TIMES DAILY PRN
Qty: 90 TAB | Refills: 0 | Status: SHIPPED | OUTPATIENT
Start: 2018-06-08 | End: 2019-01-12 | Stop reason: SDUPTHER

## 2018-07-22 DIAGNOSIS — G43.709 CHRONIC MIGRAINE WITHOUT AURA WITHOUT STATUS MIGRAINOSUS, NOT INTRACTABLE: ICD-10-CM

## 2018-07-24 RX ORDER — SUMATRIPTAN 50 MG/1
TABLET, FILM COATED ORAL
Qty: 30 TAB | Refills: 3 | Status: SHIPPED | OUTPATIENT
Start: 2018-07-24 | End: 2019-11-18 | Stop reason: SDUPTHER

## 2018-08-28 ENCOUNTER — APPOINTMENT (OUTPATIENT)
Dept: NEPHROLOGY | Facility: MEDICAL CENTER | Age: 48
End: 2018-08-28
Payer: COMMERCIAL

## 2018-09-24 ENCOUNTER — NON-PROVIDER VISIT (OUTPATIENT)
Dept: OCCUPATIONAL MEDICINE | Facility: CLINIC | Age: 48
End: 2018-09-24

## 2018-09-24 DIAGNOSIS — Z23 NEED FOR VACCINATION: ICD-10-CM

## 2018-09-30 PROCEDURE — 90686 IIV4 VACC NO PRSV 0.5 ML IM: CPT | Performed by: PREVENTIVE MEDICINE

## 2018-10-18 DIAGNOSIS — F33.42 MAJOR DEPRESSIVE DISORDER, RECURRENT, IN FULL REMISSION (HCC): ICD-10-CM

## 2018-10-18 RX ORDER — FLUOXETINE HYDROCHLORIDE 40 MG/1
40 CAPSULE ORAL
Qty: 90 CAP | Refills: 3 | Status: SHIPPED | OUTPATIENT
Start: 2018-10-18 | End: 2019-10-14 | Stop reason: SDUPTHER

## 2019-01-12 DIAGNOSIS — G44.209 TENSION HEADACHE: ICD-10-CM

## 2019-01-14 RX ORDER — METHOCARBAMOL 500 MG/1
TABLET, FILM COATED ORAL
Qty: 90 TAB | Refills: 1 | Status: SHIPPED | OUTPATIENT
Start: 2019-01-14 | End: 2019-12-16

## 2019-06-18 ENCOUNTER — HOSPITAL ENCOUNTER (OUTPATIENT)
Dept: LAB | Facility: MEDICAL CENTER | Age: 49
End: 2019-06-18
Payer: COMMERCIAL

## 2019-06-18 LAB
BDY FAT % MEASURED: 19.4 %
BP DIAS: 48 MMHG
BP SYS: 90 MMHG
CHOLEST SERPL-MCNC: 224 MG/DL (ref 100–199)
DIABETES HTDIA: NO
EVENT NAME HTEVT: NORMAL
FASTING HTFAS: YES
GLUCOSE SERPL-MCNC: 87 MG/DL (ref 65–99)
HDLC SERPL-MCNC: 83 MG/DL
HYPERTENSION HTHYP: NO
LDLC SERPL CALC-MCNC: 131 MG/DL
SCREENING LOC CITY HTCIT: NORMAL
SCREENING LOC STATE HTSTA: NORMAL
SCREENING LOCATION HTLOC: NORMAL
SMOKING HTSMO: NO
SUBSCRIBER ID HTSID: NORMAL
TRIGL SERPL-MCNC: 51 MG/DL (ref 0–149)

## 2019-06-18 PROCEDURE — 80061 LIPID PANEL: CPT

## 2019-06-18 PROCEDURE — S5190 WELLNESS ASSESSMENT BY NONPH: HCPCS

## 2019-06-18 PROCEDURE — 36415 COLL VENOUS BLD VENIPUNCTURE: CPT

## 2019-06-18 PROCEDURE — 82947 ASSAY GLUCOSE BLOOD QUANT: CPT

## 2019-07-24 DIAGNOSIS — N18.30 CKD (CHRONIC KIDNEY DISEASE) STAGE 3, GFR 30-59 ML/MIN (HCC): ICD-10-CM

## 2019-07-25 ENCOUNTER — PATIENT MESSAGE (OUTPATIENT)
Dept: MEDICAL GROUP | Age: 49
End: 2019-07-25

## 2019-07-25 DIAGNOSIS — D64.9 NORMOCYTIC ANEMIA: ICD-10-CM

## 2019-07-25 DIAGNOSIS — F33.42 MAJOR DEPRESSIVE DISORDER, RECURRENT, IN FULL REMISSION (HCC): ICD-10-CM

## 2019-07-25 DIAGNOSIS — N95.1 PERIMENOPAUSAL: ICD-10-CM

## 2019-07-25 DIAGNOSIS — G43.709 CHRONIC MIGRAINE WITHOUT AURA WITHOUT STATUS MIGRAINOSUS, NOT INTRACTABLE: ICD-10-CM

## 2019-07-25 DIAGNOSIS — E61.1 IRON DEFICIENCY: ICD-10-CM

## 2019-07-25 DIAGNOSIS — N18.30 CKD (CHRONIC KIDNEY DISEASE) STAGE 3, GFR 30-59 ML/MIN (HCC): ICD-10-CM

## 2019-07-26 NOTE — TELEPHONE ENCOUNTER
From: Lilian Nielson  To: Georgie Ashley M.D.  Sent: 7/25/2019 1:23 PM PDT  Subject: Non-Urgent Medical Question    I would like to call and make an appointment for an annual exam, as I feel I am in pre-menopause, or full on menopause. However first would like to see if you can place an order for lab work to check my labs prior to my visit. I hope to have the visit and lab results at once. Please let me know if a lab order to [facility] labs can be submitted? Thank you for your time and consideration.  Lilian Nielson

## 2019-07-26 NOTE — PATIENT COMMUNICATION
Patient requests to have blood tests before follow-up visit.  I ordered fasting blood tests based on her chronic medical conditions and her symptoms.    Georgie Ashley M.D.

## 2019-08-14 ENCOUNTER — HOSPITAL ENCOUNTER (OUTPATIENT)
Dept: LAB | Facility: MEDICAL CENTER | Age: 49
End: 2019-08-14
Attending: INTERNAL MEDICINE
Payer: COMMERCIAL

## 2019-08-14 ENCOUNTER — TELEPHONE (OUTPATIENT)
Dept: MEDICAL GROUP | Facility: PHYSICIAN GROUP | Age: 49
End: 2019-08-14

## 2019-08-14 DIAGNOSIS — G43.709 CHRONIC MIGRAINE WITHOUT AURA WITHOUT STATUS MIGRAINOSUS, NOT INTRACTABLE: ICD-10-CM

## 2019-08-14 DIAGNOSIS — E61.1 IRON DEFICIENCY: ICD-10-CM

## 2019-08-14 DIAGNOSIS — F33.42 MAJOR DEPRESSIVE DISORDER, RECURRENT, IN FULL REMISSION (HCC): ICD-10-CM

## 2019-08-14 DIAGNOSIS — N18.30 CKD (CHRONIC KIDNEY DISEASE) STAGE 3, GFR 30-59 ML/MIN (HCC): ICD-10-CM

## 2019-08-14 DIAGNOSIS — D64.9 NORMOCYTIC ANEMIA: ICD-10-CM

## 2019-08-14 DIAGNOSIS — N95.1 PERIMENOPAUSAL: ICD-10-CM

## 2019-08-14 LAB
ALBUMIN SERPL BCP-MCNC: 4.4 G/DL (ref 3.2–4.9)
ALBUMIN/GLOB SERPL: 1.5 G/DL
ALP SERPL-CCNC: 58 U/L (ref 30–99)
ALT SERPL-CCNC: 16 U/L (ref 2–50)
ANION GAP SERPL CALC-SCNC: 12 MMOL/L (ref 0–11.9)
ANION GAP SERPL CALC-SCNC: 13 MMOL/L (ref 0–11.9)
AST SERPL-CCNC: 20 U/L (ref 12–45)
BASOPHILS # BLD AUTO: 2.2 % (ref 0–1.8)
BASOPHILS # BLD: 0.16 K/UL (ref 0–0.12)
BILIRUB SERPL-MCNC: 0.3 MG/DL (ref 0.1–1.5)
BUN SERPL-MCNC: 31 MG/DL (ref 8–22)
BUN SERPL-MCNC: 31 MG/DL (ref 8–22)
CALCIUM SERPL-MCNC: 9.3 MG/DL (ref 8.5–10.5)
CALCIUM SERPL-MCNC: 9.4 MG/DL (ref 8.5–10.5)
CHLORIDE SERPL-SCNC: 99 MMOL/L (ref 96–112)
CHLORIDE SERPL-SCNC: 99 MMOL/L (ref 96–112)
CO2 SERPL-SCNC: 29 MMOL/L (ref 20–33)
CO2 SERPL-SCNC: 30 MMOL/L (ref 20–33)
CREAT SERPL-MCNC: 1.43 MG/DL (ref 0.5–1.4)
CREAT SERPL-MCNC: 1.48 MG/DL (ref 0.5–1.4)
CREAT UR-MCNC: 93.2 MG/DL
EOSINOPHIL # BLD AUTO: 0.54 K/UL (ref 0–0.51)
EOSINOPHIL NFR BLD: 7.3 % (ref 0–6.9)
ERYTHROCYTE [DISTWIDTH] IN BLOOD BY AUTOMATED COUNT: 49.1 FL (ref 35.9–50)
GLOBULIN SER CALC-MCNC: 2.9 G/DL (ref 1.9–3.5)
GLUCOSE SERPL-MCNC: 54 MG/DL (ref 65–99)
GLUCOSE SERPL-MCNC: 56 MG/DL (ref 65–99)
HCT VFR BLD AUTO: 44.4 % (ref 37–47)
HGB BLD-MCNC: 13.6 G/DL (ref 12–16)
IMM GRANULOCYTES # BLD AUTO: 0.02 K/UL (ref 0–0.11)
IMM GRANULOCYTES NFR BLD AUTO: 0.3 % (ref 0–0.9)
IRON SATN MFR SERPL: 12 % (ref 15–55)
IRON SERPL-MCNC: 47 UG/DL (ref 40–170)
LYMPHOCYTES # BLD AUTO: 1.52 K/UL (ref 1–4.8)
LYMPHOCYTES NFR BLD: 20.7 % (ref 22–41)
MCH RBC QN AUTO: 27 PG (ref 27–33)
MCHC RBC AUTO-ENTMCNC: 30.6 G/DL (ref 33.6–35)
MCV RBC AUTO: 88.3 FL (ref 81.4–97.8)
MICROALBUMIN UR-MCNC: 1.4 MG/DL
MICROALBUMIN/CREAT UR: 15 MG/G (ref 0–30)
MONOCYTES # BLD AUTO: 0.65 K/UL (ref 0–0.85)
MONOCYTES NFR BLD AUTO: 8.8 % (ref 0–13.4)
NEUTROPHILS # BLD AUTO: 4.46 K/UL (ref 2–7.15)
NEUTROPHILS NFR BLD: 60.7 % (ref 44–72)
NRBC # BLD AUTO: 0 K/UL
NRBC BLD-RTO: 0 /100 WBC
PLATELET # BLD AUTO: 413 K/UL (ref 164–446)
PMV BLD AUTO: 9.2 FL (ref 9–12.9)
POTASSIUM SERPL-SCNC: 2.1 MMOL/L (ref 3.6–5.5)
POTASSIUM SERPL-SCNC: 2.1 MMOL/L (ref 3.6–5.5)
PROT SERPL-MCNC: 7.3 G/DL (ref 6–8.2)
RBC # BLD AUTO: 5.03 M/UL (ref 4.2–5.4)
SODIUM SERPL-SCNC: 141 MMOL/L (ref 135–145)
SODIUM SERPL-SCNC: 141 MMOL/L (ref 135–145)
TIBC SERPL-MCNC: 402 UG/DL (ref 250–450)
WBC # BLD AUTO: 7.4 K/UL (ref 4.8–10.8)

## 2019-08-14 PROCEDURE — 83001 ASSAY OF GONADOTROPIN (FSH): CPT

## 2019-08-14 PROCEDURE — 36415 COLL VENOUS BLD VENIPUNCTURE: CPT

## 2019-08-14 PROCEDURE — 82570 ASSAY OF URINE CREATININE: CPT

## 2019-08-14 PROCEDURE — 84443 ASSAY THYROID STIM HORMONE: CPT

## 2019-08-14 PROCEDURE — 82670 ASSAY OF TOTAL ESTRADIOL: CPT

## 2019-08-14 PROCEDURE — 85025 COMPLETE CBC W/AUTO DIFF WBC: CPT

## 2019-08-14 PROCEDURE — 80048 BASIC METABOLIC PNL TOTAL CA: CPT

## 2019-08-14 PROCEDURE — 82728 ASSAY OF FERRITIN: CPT

## 2019-08-14 PROCEDURE — 80053 COMPREHEN METABOLIC PANEL: CPT

## 2019-08-14 PROCEDURE — 83002 ASSAY OF GONADOTROPIN (LH): CPT

## 2019-08-14 PROCEDURE — 83540 ASSAY OF IRON: CPT

## 2019-08-14 PROCEDURE — 82306 VITAMIN D 25 HYDROXY: CPT

## 2019-08-14 PROCEDURE — 82043 UR ALBUMIN QUANTITATIVE: CPT

## 2019-08-14 PROCEDURE — 84439 ASSAY OF FREE THYROXINE: CPT

## 2019-08-14 PROCEDURE — 82607 VITAMIN B-12: CPT

## 2019-08-14 PROCEDURE — 82746 ASSAY OF FOLIC ACID SERUM: CPT

## 2019-08-14 PROCEDURE — 83550 IRON BINDING TEST: CPT

## 2019-08-15 ENCOUNTER — TELEPHONE (OUTPATIENT)
Dept: MEDICAL GROUP | Age: 49
End: 2019-08-15

## 2019-08-15 DIAGNOSIS — E87.6 HYPOKALEMIA: ICD-10-CM

## 2019-08-15 LAB
25(OH)D3 SERPL-MCNC: 29 NG/ML (ref 30–100)
ESTRADIOL SERPL-MCNC: <20 PG/ML
FERRITIN SERPL-MCNC: 9.7 NG/ML (ref 10–291)
FOLATE SERPL-MCNC: >22.4 NG/ML
T4 FREE SERPL-MCNC: 0.66 NG/DL (ref 0.53–1.43)
TSH SERPL DL<=0.005 MIU/L-ACNC: 1.76 UIU/ML (ref 0.38–5.33)
VIT B12 SERPL-MCNC: 473 PG/ML (ref 211–911)

## 2019-08-15 RX ORDER — POTASSIUM CHLORIDE 20 MEQ/1
20 TABLET, EXTENDED RELEASE ORAL 2 TIMES DAILY
Qty: 60 TAB | Refills: 6 | Status: SHIPPED | OUTPATIENT
Start: 2019-08-15 | End: 2020-03-23

## 2019-08-15 NOTE — TELEPHONE ENCOUNTER
Phone Number Called: [phone] (home)       Call outcome: spoke to patient regarding message below    Message: Called & spoke to pt. Advised pt Dr. Ashley would like to see her regarding lab results. Per request scheduled appoint 08/20/19 @ 8:20AM. Advised prior 15 minute check -in.

## 2019-08-15 NOTE — TELEPHONE ENCOUNTER
Please call patient to set up follow-up appointment next week.    I called patient and spoke with patient regarding her low potassium level at 2.1.  She stated that she stopped taking potassium supplement and she only eats high potassium diet such as banana.  She agreed to retake potassium chloride 20 mEq 1 tablet twice a day.  I refilled potassium to [facility] pharmacy.  I also discussed her kidney function with her.  Patient still has abnormal kidney function.  She was seen by nephrologist Dr. Najjar last year, but she did not follow up with him regularly.  I advised patient to follow-up with Dr. Najjar again.  She agreed with the plan.    I also advised patient to keep well-hydrated with water and limit intake of caffeine and avoid NSAIDs and alcohol.  Patient states that she does not drink alcohol.     I advised patient to follow-up in clinic to review the rest of her blood test result.  Patient will set up follow-up appointment next week.    I discussed ED precautions with patient: seek emergency evaluation for symptoms including but not limited to: worsening symptoms or developing any new symptoms, crushing chest pain, chest pain associated with difficulty breathing, nausea, or sweats, heart rate irregular or too fast to count.   Any change or worsening of signs or symptoms, patient encouraged to follow-up or report to emergency room for further evaluation. Patient understands and agrees.    Georgie Ashley M.D.

## 2019-08-16 LAB
FSH SERPL-ACNC: 278.3 MIU/ML
LH SERPL-ACNC: 151 IU/L

## 2019-08-16 NOTE — RESULT ENCOUNTER NOTE
I called patient and informed her for low potassium level on 8/15/19. Patient stopped taking potassium supplement by herself. I refilled potassium chloride 20 mEq to take twice a day. Patient agreed with the plan. I will discuss the other results in upcoming appointment on 8/20/19.     Georgie Ashley MD

## 2019-08-20 ENCOUNTER — OFFICE VISIT (OUTPATIENT)
Dept: MEDICAL GROUP | Age: 49
End: 2019-08-20
Payer: COMMERCIAL

## 2019-08-20 VITALS
BODY MASS INDEX: 13.9 KG/M2 | HEART RATE: 63 BPM | SYSTOLIC BLOOD PRESSURE: 94 MMHG | TEMPERATURE: 99 F | HEIGHT: 72 IN | DIASTOLIC BLOOD PRESSURE: 50 MMHG | OXYGEN SATURATION: 100 % | WEIGHT: 102.6 LBS

## 2019-08-20 DIAGNOSIS — E55.9 VITAMIN D INSUFFICIENCY: ICD-10-CM

## 2019-08-20 DIAGNOSIS — R63.6 UNDERWEIGHT DUE TO INADEQUATE CALORIC INTAKE: ICD-10-CM

## 2019-08-20 DIAGNOSIS — E61.1 IRON DEFICIENCY: ICD-10-CM

## 2019-08-20 DIAGNOSIS — N18.30 CKD (CHRONIC KIDNEY DISEASE) STAGE 3, GFR 30-59 ML/MIN (HCC): ICD-10-CM

## 2019-08-20 DIAGNOSIS — F33.42 MAJOR DEPRESSIVE DISORDER, RECURRENT, IN FULL REMISSION (HCC): ICD-10-CM

## 2019-08-20 DIAGNOSIS — D72.810 LYMPHOPENIA: ICD-10-CM

## 2019-08-20 DIAGNOSIS — D72.10 EOSINOPHILIA: ICD-10-CM

## 2019-08-20 DIAGNOSIS — E87.6 HYPOKALEMIA: ICD-10-CM

## 2019-08-20 DIAGNOSIS — Z12.31 VISIT FOR SCREENING MAMMOGRAM: ICD-10-CM

## 2019-08-20 DIAGNOSIS — N95.1 PERIMENOPAUSAL SYMPTOM: ICD-10-CM

## 2019-08-20 PROCEDURE — 99215 OFFICE O/P EST HI 40 MIN: CPT | Performed by: INTERNAL MEDICINE

## 2019-08-20 RX ORDER — LANOLIN ALCOHOL/MO/W.PET/CERES
325 CREAM (GRAM) TOPICAL 2 TIMES DAILY WITH MEALS
Qty: 60 TAB | Refills: 6 | Status: SHIPPED | OUTPATIENT
Start: 2019-08-20 | End: 2020-04-06

## 2019-08-20 ASSESSMENT — PATIENT HEALTH QUESTIONNAIRE - PHQ9
3. TROUBLE FALLING OR STAYING ASLEEP OR SLEEPING TOO MUCH: NOT AT ALL
4. FEELING TIRED OR HAVING LITTLE ENERGY: SEVERAL DAYS
SUM OF ALL RESPONSES TO PHQ QUESTIONS 1-9: 1
2. FEELING DOWN, DEPRESSED, IRRITABLE, OR HOPELESS: NOT AT ALL
1. LITTLE INTEREST OR PLEASURE IN DOING THINGS: NOT AT ALL
SUM OF ALL RESPONSES TO PHQ9 QUESTIONS 1 AND 2: 0
6. FEELING BAD ABOUT YOURSELF - OR THAT YOU ARE A FAILURE OR HAVE LET YOURSELF OR YOUR FAMILY DOWN: NOT AL ALL
9. THOUGHTS THAT YOU WOULD BE BETTER OFF DEAD, OR OF HURTING YOURSELF: NOT AT ALL
5. POOR APPETITE OR OVEREATING: NOT AT ALL
8. MOVING OR SPEAKING SO SLOWLY THAT OTHER PEOPLE COULD HAVE NOTICED. OR THE OPPOSITE, BEING SO FIGETY OR RESTLESS THAT YOU HAVE BEEN MOVING AROUND A LOT MORE THAN USUAL: NOT AT ALL
7. TROUBLE CONCENTRATING ON THINGS, SUCH AS READING THE NEWSPAPER OR WATCHING TELEVISION: NOT AT ALL

## 2019-08-20 ASSESSMENT — PAIN SCALES - GENERAL: PAINLEVEL: NO PAIN

## 2019-08-20 NOTE — PROGRESS NOTES
Subjective:   Jodee Nielson is a 48 y.o. female here today for evaluation and management of:      1. Iron deficiency  Patient states that she tried to eat meat but she does not eat meat a lot.  She eats mostly white meat.  Patient has history of iron deficiency on chem panels. She was taking ferous fumarate 325 mg supplements to help treat this however stopped without any specific reason.  She denied side effects from taking ferrous.  She agreed to restart iron supplements.  Patient denies chest pain or dizziness or palpitation or shortness of breath or syncope.  She also denies abdominal pain or black tarry stool or bloody stool.    Results for JODEE NIELSON (MRN [id number]) as of 8/20/2019 10:06   Ref. Range 4/19/2018 08:31 8/14/2019 09:35   Iron Latest Ref Range: 40 - 170 ug/dL 25 (L) 47   Total Iron Binding Latest Ref Range: 250 - 450 ug/dL 468 (H) 402   % Saturation Latest Ref Range: 15 - 55 % 5 (L) 12 (L)     Results for JODEE NIELSON (MRN [id number]) as of 8/20/2019 10:06   Ref. Range 4/19/2018 08:31 8/14/2019 09:35   Ferritin Latest Ref Range: 10.0 - 291.0 ng/mL 6.0 (L) 9.7 (L)   Folate -Folic Acid Latest Ref Range: >4.0 ng/mL >24.0 >22.4   Vitamin B12 -True Cobalamin Latest Ref Range: 211 - 911 pg/mL 640 473         2. CKD (chronic kidney disease) stage 3, GFR 30-59 ml/min ([facility])  Patient has history of chronic kidney disease. She is followed by Dr. Najjar, Nephrology for continued care and treatment for her kidney disease. She does not have any family history of kidney disease or a self history of hypertension. As of last year she has not been using NSAID's.  She is not taking any diuretics.  However, she likes to drink tea every day and states that she drinks about 24 ounce of tea a day.  I discussed with patient that her kidney function is stable but not improving at all.  She also has elevated eosinophils on CBC.  I want her to discuss with nephrologist regarding possible causes of chronic kidney disease  including possible eosinophilic nephritis.  However, patient does not have any elevated blood pressure or edema or proteinuria.  She has normal microalbuminuria on recent blood test.    Results for JODEE PRIETO (MRN [id number]) as of 8/20/2019 10:06   Ref. Range 4/19/2018 08:31 5/23/2018 08:02 6/18/2019 08:00 8/14/2019 09:35 8/14/2019 09:35   Bun Latest Ref Range: 8 - 22 mg/dL 24 (H) 30 (H)  31 (H) 31 (H)   Creatinine Latest Ref Range: 0.50 - 1.40 mg/dL 1.57 (H) 1.23  1.48 (H) 1.43 (H)   GFR If  Latest Ref Range: >60 mL/min/1.73 m 2 42 (A) 56 (A)  45 (A) 47 (A)   GFR If Non  Latest Ref Range: >60 mL/min/1.73 m 2 35 (A) 47 (A)  37 (A) 39 (A)     Results for JODEE PRIETO (MRN [id number]) as of 8/20/2019 10:06   Ref. Range 8/14/2019 09:35   Micro Alb Creat Ratio Latest Ref Range: 0 - 30 mg/g 15   Creatinine, Urine Latest Units: mg/dL 93.20   Microalbumin, Urine Random Latest Units: mg/dL 1.4       3. Underweight due to inadequate caloric intake  Patient is underweight with a BMI of 13.92. She states that she is not stimulated by food however is not attempting to avoid eating or vomit her food back up. She claims that she wants to gain weight however is unsure about how to go about doing it safely.  She has history of anorexia nervosa in the past and states that she already recover from it.  She also denies having depressed mood.  She also denies abdominal pain or nausea or vomiting or diarrhea or constipation or blood in stool.    4. Major depressive disorder, recurrent, in full remission (HCC)  Patient has previous history of depression and is taking fluoxetine 40 mg QD. She claims that she is compliant with this medication and does not feel depressed while she takes it.    5. Hypokalemia  Patient's potassium levels are low on most recent lab draws from 8/14. She states that she is not eating a lot however is still eating as much as she wants when she is hungry and to where eats to  where she is comfortable. Further her BMI is low at 13.92. She denies any nausea or attempts to vomit after eating and denies a fear of gaining weight. She denies any current episodes of depression. Patient was restarted on potassium chloride 20 mEq twice a day on 8/15/19.    I discussed the blood test with the patient in clinic today  Results for JODEE PRIETO (MRN [id number]) as of 8/20/2019 08:38   Ref. Range 5/23/2018 08:02 8/14/2019 09:35 8/14/2019 09:35   Potassium Latest Ref Range: 3.6 - 5.5 mmol/L 3.0 (L) 2.1 (LL) 2.1 (LL)       6. Vitamin D insufficiency  Patients most recent vitamin D levels are low from labs on 8/14/19. She is not taking any Vitamin D supplements at this time.  Advised patient to start taking multivitamins daily.    I discussed the blood test with the patient in clinic today  Results for JODEE PRIETO (MRN [id number]) as of 8/20/2019 08:38   Ref. Range 8/14/2019 09:35   25-Hydroxy   Vitamin D 25 Latest Ref Range: 30 - 100 ng/mL 29 (L)       7. Visit for screening mammogram  Patient is due for her mammogram as apart of her routine health maintenance    8. Lymphopenia  Discussed with the patient that her lymphocytes are consistently low on lab draws.  I discussed any possible causes of immunosuppression with patient including infection, disease, medication induced, poor nutrition.  Patient stated that she does not have any history of illicit drug use, alcohol use, history of sharing needle or blood transfusion.  She is  and only have one sexual partner with her  for more than 20 years.    I discussed the blood test with the patient in clinic today  Results for JODEE PRIETO (MRN [id number]) as of 8/20/2019 08:38   Ref. Range 4/7/2018 09:05 8/14/2019 09:35   Lymphocytes Latest Ref Range: 22.00 - 41.00 % 18.90 (L) 20.70 (L)       9. Eosinophilia  Discussed with the patient that her Eosinophils are also found to be frequently elevated, and she was made aware that she should address  this with Dr. Najjar as a possibility of the cause of her worsening kidney function.  Patient denies painful swallowing or any symptoms of esophagitis.  Patient also reports that she may have allergy symptoms living in [place], but nothing unusual.    I discussed the blood test with the patient in clinic today  Results for JODEE PRIETO (MRN [id number]) as of 8/20/2019 08:38   Ref. Range 4/7/2018 09:05 8/14/2019 09:35   Eosinophils Latest Ref Range: 0.00 - 6.90 % 16.40 (H) 7.30 (H)     Perimenopausal symptom  Patient reported that she does not have regular menstruation since she delivered her daughter 15 years ago.  She has irregular menstrual cycle and mostly very light and spotting only.  She has her last menstruation in April 2019 which is only spotting for few days.  She also feels hot flashes lately.  Her thyroid function test normal.  However her FSH and LH is very high which is consistent with menopause.  Patient is also due for pelvic exam and Pap smear.  She agrees to follow with gynecologist for discussion of her perimenopausal symptoms and Pap smear for screening cervical cancer.    Results for JODEE PRIETO (MRN [id number]) as of 8/20/2019 10:06   Ref. Range 8/14/2019 09:35   Estradiol-E2 Latest Units: pg/mL <20.0   Follicle Stimulating Hormone Latest Units: mIU/mL 278.3   Luteinizing Hormone Latest Units: IU/L 151.0     Results for JODEE PRIETO (MRN [id number]) as of 8/20/2019 10:06   Ref. Range 8/14/2019 09:35   TSH Latest Ref Range: 0.380 - 5.330 uIU/mL 1.760   Free T-4 Latest Ref Range: 0.53 - 1.43 ng/dL 0.66       Current medicines (including changes today)  Current Outpatient Medications   Medication Sig Dispense Refill   • ferrous sulfate 325 (65 Fe) MG EC tablet Take 1 Tab by mouth 2 times a day, with meals. 60 Tab 6   • potassium chloride SA (KDUR) 20 MEQ Tab CR Take 1 Tab by mouth 2 times a day. 60 Tab 6   • methocarbamol (ROBAXIN) 500 MG Tab TAKE 1 TAB BY MOUTH EVERY 8 HOURS ( MAX 3 TIMES A DAY)  AS NEEDED FOR MUSCLE TENSION. 90 Tab 1   • fluoxetine (PROZAC) 40 MG capsule TAKE 1 CAP BY MOUTH EVERY DAY. 90 Cap 3   • SUMAtriptan (IMITREX) 50 MG Tab TAKE 1 TAB BY MOUTH ONCE AS NEEDED FOR MIGRAINE FOR UP TO 1 DOSE. **9TABS/23 DS PER INS** 30 Tab 3     No current facility-administered medications for this visit.      She  has a past medical history of Amenorrhea and Migraine (2011). She also has no past medical history of Breast cancer (HCC) or Cancer (HCC).    ROS   No chest pain, no shortness of breath, no abdominal pain       Objective:     BP (!) 94/50 (BP Location: Left arm, Patient Position: Sitting, BP Cuff Size: Adult)   Pulse 63   Temp 37.2 °C (99 °F) (Temporal)   Ht 1.829 m (6')   Wt 46.5 kg (102 lb 9.6 oz)   SpO2 100%  Body mass index is 13.92 kg/m².   Physical Exam:  General: Alert, oriented and no acute distress.  Eye contact is good, speech goal directed, affect calm  HEENT: conjunctiva non-injected, sclera non-icteric.  Oral mucous membranes pink and moist with no lesions.  Pinna normal.   Lungs: Normal respiratory effort, clear to auscultation bilaterally with good excursion.  CV: regular rate and rhythm. No murmurs. No carotid bruits.  Abdomen: soft, non distended, nontender, Bowel sound normal.  Ext: no edema, color normal, vascularity normal, temperature normal        Assessment and Plan:   The following treatment plan was discussed     1. Iron deficiency  - Not well-controlled.  Advised to restart ferrous sulfate 325 mg twice daily and reduce tea intake.  Advised to take vitamin C to improve iron absorption.  Discussed to eat iron rich diet and encouraged to eat meat.  Refer to nutritionist.  Recheck lab 1-2 weeks before next follow up visit.  - CBC WITH DIFFERENTIAL; Future  - Comp Metabolic Panel; Future  - IRON/TOTAL IRON BIND; Future  - VITAMIN B12; Future  - FOLATE; Future  - FERRITIN; Future  - ferrous sulfate 325 (65 Fe) MG EC tablet; Take 1 Tab by mouth 2 times a day, with meals.   Dispense: 60 Tab; Refill: 6  - REFERRAL TO NUTRITION SERVICES    2. CKD (chronic kidney disease) stage 3, GFR 30-59 ml/min ([facility])  - Discussed possible causes of chronic kidney disease with patient.  Advised patient to schedule with nephrologist to follow-up on her kidney problem and discuss any possible causes of kidney disease.  Recommend to continue avoids NSAIDs.  Advised to limit use of caffeine including tea.  Recommend to keep well-hydrated with water and at least 40 to 60 ounce of water per day.  Refer to nutritionist for further discussion on her nutrition.  - Comp Metabolic Panel; Future  - VITAMIN D,25 HYDROXY; Future  - REFERRAL TO NUTRITION SERVICES    3. Underweight due to inadequate caloric intake  - Discussed possible causes with patient.  I discussed my concern of her body weight. I advised patient to do infection screening and age-appropriate cancer screening.  Patient agrees to undergo disease and infection screening to check for any underlying causes that may be causing her weight loss. She will also follow up with a nutritionist and therapist to evaluate her eating patterns.  I will refer her to GI for colon cancer screening after she turns to age 49.  - Refer to nutritionist and behavioral therapist to help to improve nutrition and eating habit.  Patient agrees to referral to specialist.  - VITAMIN D,25 HYDROXY; Future  - IRON/TOTAL IRON BIND; Future  - VITAMIN B12; Future  - FOLATE; Future  - FERRITIN; Future  - HEPATITIS PANEL ACUTE(4 COMPONENTS); Future  - HIV AG/AB COMBO ASSAY SCREENING; Future  - REFERRAL TO NUTRITION SERVICES  - REFERRAL TO BEHAVIORAL HEALTH    4. Major depressive disorder, recurrent, in full remission (HCC)  - Well-controlled. Continue current regimens, Prozac 40 mg daily. Recheck lab 1-2 weeks before next follow up visit.  - Discussed with patient regarding the use and side effects of medication as well as black box warning of suicidality risk with medication. Patient  verbally understands. Recommend to call 911 or go to ER if patient has suicidal ideation or plan.  - Refer to psychologist for counseling to improve depression and eating habit.  Even though patient denied that she still has symptoms of anorexia nervosa, she is still having difficulty to gain weight and she does not want to eat a lot.  She agrees to refer to psychologist and nutritionist.  - VITAMIN D,25 HYDROXY; Future  - IRON/TOTAL IRON BIND; Future  - VITAMIN B12; Future  - FOLATE; Future  - FERRITIN; Future  - REFERRAL TO NUTRITION SERVICES  - REFERRAL TO BEHAVIORAL HEALTH    5. Hypokalemia  - Continue potassium chloride 20 mEq twice daily.  I discussed with patient to eat potassium rich diet and to keep well-hydrated.  I will recheck blood test with CMP in 2 to 3 months for follow-up.  - REFERRAL TO NUTRITION SERVICES    6. Vitamin D insufficiency  - Slightly low vitamin D at 28 advised to take multivitamins once a day which include vitamin D.  Recheck vitamin D in future.  - VITAMIN D,25 HYDROXY; Future  - REFERRAL TO NUTRITION SERVICES    7. Lymphopenia  - Complete infectious disease screening for concerns that there may be an underlying cause of her lymphopenia  - HEPATITIS PANEL ACUTE(4 COMPONENTS); Future  - HIV AG/AB COMBO ASSAY SCREENING; Future  - REFERRAL TO NUTRITION SERVICES    8. Eosinophilia  - Discussed possible causes of eosinophilia.  Patient denies significant allergy symptoms or esophagitis.  Advised patient to discuss with nephrologist regarding any possible eosinophilic nephritis.  - HEPATITIS PANEL ACUTE(4 COMPONENTS); Future  - HIV AG/AB COMBO ASSAY SCREENING; Future  - REFERRAL TO NUTRITION SERVICES    9. Perimenopausal symptom  - Her recent hormone test showed that she has very high FSH and LH.  I advised patient to discuss and follow with gynecologist regarding her menopausal and perimenopausal symptoms.  Patient is also due for Pap smear which she agrees to do it with gynecologist.  -  REFERRAL TO GYNECOLOGY    10. Visit for screening mammogram  - Counseled for breast cancer screening.  Order screening mammogram.  - MA-SCREEN MAMMO W/CAD-BILAT; Future    11. Health Maintenance  - We will postpone Prevnar 13, Hepatitis B vaccination.  She will have screening blood tests to rule out infection first.  Patient was referred to gynecologist for pelvic exam and Pap smear.  Mammogram was ordered today.    Face-to-face time spent 40 minutes with patient and more than 50% of that time spent for counseling, discussing problems documented above, coordinating care for medical problems listed above, and answering patient's questions by provider.       Followup: Return in about 8 weeks (around 10/15/2019), or if symptoms worsen or fail to improve, for Iron deficiency, underweight, lymphopenia, eosinophilia, vitamin D deficiency, CKD, Depression, Lab review.     Please note that this dictation was created using voice recognition software. I have made every reasonable attempt to correct obvious errors, but I expect that there may have unintended errors in text, spelling, punctuation, or grammar that I did not discover.     I, Shane Lara (Scribe), am scribing for, and in the presence of, No att. providers found.    Electronically signed by: Shane Lara (Scribe), 8/20/2019    I, Georgie Ashley M.D., personally performed the services described in this documentation, as scribed by Shane Lara in my presence, and it is both accurate and complete.

## 2019-09-05 ENCOUNTER — HOSPITAL ENCOUNTER (OUTPATIENT)
Dept: RADIOLOGY | Facility: MEDICAL CENTER | Age: 49
End: 2019-09-05
Attending: INTERNAL MEDICINE
Payer: COMMERCIAL

## 2019-09-05 DIAGNOSIS — Z12.31 VISIT FOR SCREENING MAMMOGRAM: ICD-10-CM

## 2019-09-05 PROCEDURE — 77063 BREAST TOMOSYNTHESIS BI: CPT

## 2019-09-17 ENCOUNTER — APPOINTMENT (OUTPATIENT)
Dept: NEPHROLOGY | Facility: MEDICAL CENTER | Age: 49
End: 2019-09-17
Payer: COMMERCIAL

## 2019-09-20 ENCOUNTER — IMMUNIZATION (OUTPATIENT)
Dept: OCCUPATIONAL MEDICINE | Facility: CLINIC | Age: 49
End: 2019-09-20

## 2019-09-20 DIAGNOSIS — Z23 NEED FOR VACCINATION: ICD-10-CM

## 2019-09-20 PROCEDURE — 90686 IIV4 VACC NO PRSV 0.5 ML IM: CPT | Performed by: PREVENTIVE MEDICINE

## 2019-10-14 DIAGNOSIS — F33.42 MAJOR DEPRESSIVE DISORDER, RECURRENT, IN FULL REMISSION (HCC): ICD-10-CM

## 2019-10-14 RX ORDER — FLUOXETINE HYDROCHLORIDE 40 MG/1
CAPSULE ORAL
Qty: 90 CAP | Refills: 3 | Status: SHIPPED | OUTPATIENT
Start: 2019-10-14 | End: 2020-09-09 | Stop reason: SDUPTHER

## 2019-11-18 DIAGNOSIS — G43.709 CHRONIC MIGRAINE WITHOUT AURA WITHOUT STATUS MIGRAINOSUS, NOT INTRACTABLE: ICD-10-CM

## 2019-11-18 RX ORDER — SUMATRIPTAN 50 MG/1
TABLET, FILM COATED ORAL
Qty: 9 TAB | Refills: 13 | Status: SHIPPED | OUTPATIENT
Start: 2019-11-18 | End: 2020-09-09 | Stop reason: SDUPTHER

## 2019-12-16 DIAGNOSIS — G44.209 TENSION HEADACHE: ICD-10-CM

## 2019-12-16 RX ORDER — METHOCARBAMOL 500 MG/1
TABLET, FILM COATED ORAL
Qty: 90 TAB | Refills: 0 | Status: SHIPPED | OUTPATIENT
Start: 2019-12-16 | End: 2020-01-20

## 2020-01-18 DIAGNOSIS — G44.209 TENSION HEADACHE: ICD-10-CM

## 2020-01-20 RX ORDER — METHOCARBAMOL 500 MG/1
TABLET, FILM COATED ORAL
Qty: 90 TAB | Refills: 0 | Status: SHIPPED | OUTPATIENT
Start: 2020-01-20 | End: 2020-09-09 | Stop reason: SDUPTHER

## 2020-03-22 DIAGNOSIS — E87.6 HYPOKALEMIA: ICD-10-CM

## 2020-03-23 RX ORDER — POTASSIUM CHLORIDE 20 MEQ/1
TABLET, EXTENDED RELEASE ORAL
Qty: 180 TAB | Refills: 0 | Status: SHIPPED | OUTPATIENT
Start: 2020-03-23 | End: 2020-06-29

## 2020-06-27 DIAGNOSIS — E87.6 HYPOKALEMIA: ICD-10-CM

## 2020-06-29 RX ORDER — POTASSIUM CHLORIDE 20 MEQ/1
TABLET, EXTENDED RELEASE ORAL
Qty: 180 TAB | Refills: 0 | Status: SHIPPED | OUTPATIENT
Start: 2020-06-29 | End: 2020-10-20 | Stop reason: SDUPTHER

## 2020-06-29 NOTE — TELEPHONE ENCOUNTER
Please remind patient to set up appointment to establish with a new PCP. Thanks! Estefanía Lee P.A.-C. covering for Georgie Ashley M.D.

## 2020-06-29 NOTE — TELEPHONE ENCOUNTER
Phone Number Called: [phone] (home)     Call outcome: Spoke to patient regarding message below.    Message: Patient will call back when she wants to schedule

## 2020-07-22 ENCOUNTER — HOSPITAL ENCOUNTER (OUTPATIENT)
Dept: LAB | Facility: MEDICAL CENTER | Age: 50
End: 2020-07-22
Payer: COMMERCIAL

## 2020-07-22 LAB
BDY FAT % MEASURED: 14.6 %
BP DIAS: 49 MMHG
BP SYS: 90 MMHG
CHOLEST SERPL-MCNC: 207 MG/DL (ref 100–199)
DIABETES HTDIA: NO
EVENT NAME HTEVT: NORMAL
FASTING HTFAS: YES
GLUCOSE SERPL-MCNC: 91 MG/DL (ref 65–99)
HDLC SERPL-MCNC: 83 MG/DL
HYPERTENSION HTHYP: NO
LDLC SERPL CALC-MCNC: 112 MG/DL
SCREENING LOC CITY HTCIT: NORMAL
SCREENING LOC STATE HTSTA: NORMAL
SCREENING LOCATION HTLOC: NORMAL
SMOKING HTSMO: NO
SUBSCRIBER ID HTSID: NORMAL
TRIGL SERPL-MCNC: 62 MG/DL (ref 0–149)

## 2020-07-22 PROCEDURE — 36415 COLL VENOUS BLD VENIPUNCTURE: CPT

## 2020-07-22 PROCEDURE — S5190 WELLNESS ASSESSMENT BY NONPH: HCPCS

## 2020-07-22 PROCEDURE — 80061 LIPID PANEL: CPT

## 2020-07-22 PROCEDURE — 82947 ASSAY GLUCOSE BLOOD QUANT: CPT

## 2020-09-09 ENCOUNTER — OFFICE VISIT (OUTPATIENT)
Dept: MEDICAL GROUP | Facility: MEDICAL CENTER | Age: 50
End: 2020-09-09
Payer: COMMERCIAL

## 2020-09-09 VITALS
RESPIRATION RATE: 18 BRPM | SYSTOLIC BLOOD PRESSURE: 98 MMHG | TEMPERATURE: 97.5 F | HEART RATE: 67 BPM | BODY MASS INDEX: 15.03 KG/M2 | HEIGHT: 70 IN | WEIGHT: 105 LBS | OXYGEN SATURATION: 98 % | DIASTOLIC BLOOD PRESSURE: 60 MMHG

## 2020-09-09 DIAGNOSIS — G43.109 MIGRAINE WITH AURA AND WITHOUT STATUS MIGRAINOSUS, NOT INTRACTABLE: ICD-10-CM

## 2020-09-09 DIAGNOSIS — Z12.31 ENCOUNTER FOR SCREENING MAMMOGRAM FOR BREAST CANCER: ICD-10-CM

## 2020-09-09 DIAGNOSIS — F41.1 GAD (GENERALIZED ANXIETY DISORDER): ICD-10-CM

## 2020-09-09 DIAGNOSIS — E87.6 HYPOKALEMIA: ICD-10-CM

## 2020-09-09 DIAGNOSIS — R63.6 UNDERWEIGHT DUE TO INADEQUATE CALORIC INTAKE: ICD-10-CM

## 2020-09-09 DIAGNOSIS — E61.1 IRON DEFICIENCY: ICD-10-CM

## 2020-09-09 DIAGNOSIS — G44.209 TENSION HEADACHE: ICD-10-CM

## 2020-09-09 DIAGNOSIS — Z78.0 POSTMENOPAUSE: ICD-10-CM

## 2020-09-09 DIAGNOSIS — F33.42 MAJOR DEPRESSIVE DISORDER, RECURRENT, IN FULL REMISSION (HCC): ICD-10-CM

## 2020-09-09 DIAGNOSIS — D72.10 EOSINOPHILIA: ICD-10-CM

## 2020-09-09 DIAGNOSIS — N18.30 CKD (CHRONIC KIDNEY DISEASE) STAGE 3, GFR 30-59 ML/MIN: ICD-10-CM

## 2020-09-09 PROBLEM — D72.810 LYMPHOCYTOPENIA: Status: RESOLVED | Noted: 2019-08-20 | Resolved: 2020-09-09

## 2020-09-09 PROBLEM — N17.9 AKI (ACUTE KIDNEY INJURY) (HCC): Status: RESOLVED | Noted: 2018-05-30 | Resolved: 2020-09-09

## 2020-09-09 PROCEDURE — 99214 OFFICE O/P EST MOD 30 MIN: CPT | Performed by: INTERNAL MEDICINE

## 2020-09-09 RX ORDER — FLUOXETINE HYDROCHLORIDE 40 MG/1
40 CAPSULE ORAL
Qty: 90 CAP | Refills: 3 | Status: SHIPPED | OUTPATIENT
Start: 2020-09-09 | End: 2021-08-31

## 2020-09-09 RX ORDER — SUMATRIPTAN 50 MG/1
TABLET, FILM COATED ORAL
Qty: 9 TAB | Refills: 11 | Status: SHIPPED | OUTPATIENT
Start: 2020-09-09 | End: 2021-09-15 | Stop reason: SDUPTHER

## 2020-09-09 RX ORDER — METHOCARBAMOL 500 MG/1
500 TABLET, FILM COATED ORAL 2 TIMES DAILY PRN
Qty: 90 TAB | Refills: 3 | Status: SHIPPED | OUTPATIENT
Start: 2020-09-09 | End: 2021-03-24

## 2020-09-09 ASSESSMENT — FIBROSIS 4 INDEX: FIB4 SCORE: 0.61

## 2020-09-09 NOTE — PROGRESS NOTES
Established Patient    Lilian presents today with the following:    CC: depression / anxiety med refill, menopause    HPI:   49 y.o. female came in for above.    She has history of recurrent episode of anxiety and depression for decades.  Prozac has been helping to stabilize her mood.  She has history of OCD and significant anxiety.  Psychotherapy did not help.  Prozac did lower her appetite, so she has been underweight for the past decade at least.  According to chart review, her weight has been stable since 2012.  Due to low appetite, she has vitamin deficiency, iron deficiency.  She currently eats unrestricted diet although she could benefit from more appetite.    She is currently in menopause, stopped menstruating totally from April 2019.  No menopausal symptoms except for infrequent hot flash which is manageable.      She was having migraine frequently and she took Advil regularly which caused kidney injury, currently CKD stage IIIb.   Her migraines became less frequent when she went off ibuprofen completely.  In retrospect, she realized it was medication induced headache.  She currently requires 3-4 doses of Imitrex per month.  She has migraine with typical features.  She continues to take Robaxin for neck tension at night and to help sleep.    Despite CKD, her potassium has been always low.  She is currently on potassium rich food and potassium chloride 10 mg twice a day.  Her total white count is normal but there has been basophilia or eosinophilia.  She denies any allergy symptoms.    ROS  10 systems reviewed, negative except mentioned as above.      Patient Active Problem List    Diagnosis Date Noted   • Postmenopause 09/09/2020   • RAYA (generalized anxiety disorder) 09/09/2020   • Vitamin D insufficiency 08/20/2019   • Eosinophilia 08/20/2019   • Hypokalemia 08/20/2019   • Underweight due to inadequate caloric intake 04/26/2018   • Iron deficiency 04/26/2018   • CKD (chronic kidney disease) stage 3, GFR  "30-59 ml/min ([facility]) 04/26/2018   • Neck muscle spasm 10/26/2017   • Major depressive disorder, recurrent, in full remission ([facility]) 12/10/2015   • Chronic migraine without aura without status migrainosus, not intractable 02/03/2014       Current Outpatient Medications   Medication Sig Dispense Refill   • fluoxetine (PROZAC) 40 MG capsule Take 1 Cap by mouth every day. 90 Cap 3   • methocarbamol (ROBAXIN) 500 MG Tab Take 1 Tab by mouth 2 times a day as needed. 90 Tab 3   • SUMAtriptan (IMITREX) 50 MG Tab TAKE 1 TAB BY MOUTH ONCE DAILY AS NEEDED FOR MIGRAINE 9 Tab 11   • potassium chloride SA (KDUR) 20 MEQ Tab CR TAKE 1 TABLET BY MOUTH TWICE A  Tab 0     No current facility-administered medications for this visit.          BP (!) 98/60 (BP Location: Right arm, Patient Position: Sitting, BP Cuff Size: Adult)   Pulse 67   Temp 36.4 °C (97.5 °F) (Temporal)   Resp 18   Ht 1.768 m (5' 9.6\")   Wt 47.6 kg (105 lb)   SpO2 98%   BMI 15.24 kg/m²     Physical Exam  General: Alert and oriented, No apparent distress. Malnourished.  Eyes: Pupils are equal and reactive. No scleral icterus.  Throat: Clear no erythema or exudates noted.  Neck: Supple. No cervical or supraclavicular lymphadenopathy noted. Thyroid not enlarged.  Lungs: Clear to auscultation bilaterally without any wheezing, crepitations.  Cardiovascular: Regular rate and rhythm. No murmurs, rubs or gallops.  Abdomen: Bowel sound +, soft, non tender, no rebound or guarding, no palpable organomegaly  Extremities: No clubbing, cyanosis, edema.  Skin: No rash or suspicious skin lesions noted.  Neuro: A & O x 4. Normal speech and memory. Motor and sensory grossly normal.        Assessment and Plan    1. Major depressive disorder, recurrent, in full remission ([facility])  2. RAYA (generalized anxiety disorder)  Due to history of recurrent episodes and in combination with anxiety disorder, SSRI is still the best choice for her.  We will continue it for now since her weight " has been stable over the last decade.  I will look into other causes of low appetite as below.  - fluoxetine (PROZAC) 40 MG capsule; Take 1 Cap by mouth every day.  Dispense: 90 Cap; Refill: 3  -Check TSH WITH REFLEX TO FT4; Future    3. CKD (chronic kidney disease) stage 3, GFR 30-59 ml/min ([facility])  [place] decision to monitor CKD from primary care.  If there is any progression, she agrees to go back to nephrologist.  She has stopped taking NSAIDs, the last 2 kidney functions were stable.  - CBC WITH DIFFERENTIAL; Future  - Comp Metabolic Panel; Future  - MICROALBUMIN CREAT RATIO URINE; Future  - VITAMIN D,25 HYDROXY; Future  - PTH INTACT; Future  - PHOSPHORUS; Future  - URINALYSIS; Future  - URIC ACID; Future  - FERRITIN; Future  - IRON/TOTAL IRON BIND; Future    4. Hypokalemia  5. Eosinophilia  - I will recheck these.  If persistent, plan to look into possibility of adrenal insufficiency.     6. Iron deficiency  No longer on iron supplements.  -Recheck FERRITIN; Future    7. Tension headache  -Refilled methocarbamol (ROBAXIN) 500 MG Tab; Take 1 Tab by mouth 2 times a day as needed.  Dispense: 90 Tab; Refill: 3    8. Migraine with aura without status migrainosus, not intractable  -Refilled sUMAtriptan (IMITREX) 50 MG Tab; TAKE 1 TAB BY MOUTH ONCE DAILY AS NEEDED FOR MIGRAINE  Dispense: 9 Tab; Refill: 11    9. Postmenopause  Educated about daily requirements for calcium and vitamin D.  Pamphlet given for prevention of osteoporosis. recommended supplement if her dietary intake is not adequate.  She is doing weightbearing exercises, walking and running regularly.  - VITAMIN D,25 HYDROXY; Future    10. Underweight due to inadequate caloric intake  - TSH WITH REFLEX TO FT4; Future    11. Encounter for screening mammogram for breast cancer  - MA-SCREENING MAMMO BILAT W/TOMOSYNTHESIS W/CAD; Future      Follow-up 3 to 6-month depending on lab.    Signed by: Shana Romo M.D.    Patient was seen for 35 minutes face to face of  which > 50% of appointment time was spent on counseling and coordination of care regarding the above.

## 2020-09-22 ENCOUNTER — IMMUNIZATION (OUTPATIENT)
Dept: OCCUPATIONAL MEDICINE | Facility: CLINIC | Age: 50
End: 2020-09-22

## 2020-09-22 DIAGNOSIS — Z23 NEED FOR VACCINATION: ICD-10-CM

## 2020-09-22 PROCEDURE — 90686 IIV4 VACC NO PRSV 0.5 ML IM: CPT | Performed by: PREVENTIVE MEDICINE

## 2020-10-07 ENCOUNTER — HOSPITAL ENCOUNTER (OUTPATIENT)
Dept: RADIOLOGY | Facility: MEDICAL CENTER | Age: 50
End: 2020-10-07
Attending: INTERNAL MEDICINE
Payer: COMMERCIAL

## 2020-10-07 DIAGNOSIS — Z12.31 ENCOUNTER FOR SCREENING MAMMOGRAM FOR BREAST CANCER: ICD-10-CM

## 2020-10-07 PROCEDURE — 77067 SCR MAMMO BI INCL CAD: CPT

## 2020-10-08 ENCOUNTER — HOSPITAL ENCOUNTER (OUTPATIENT)
Dept: RADIOLOGY | Facility: MEDICAL CENTER | Age: 50
End: 2020-10-08
Attending: INTERNAL MEDICINE
Payer: COMMERCIAL

## 2020-10-08 DIAGNOSIS — R92.8 ABNORMAL MAMMOGRAM: ICD-10-CM

## 2020-10-08 PROCEDURE — G0279 TOMOSYNTHESIS, MAMMO: HCPCS | Mod: LT

## 2020-10-08 PROCEDURE — 76642 ULTRASOUND BREAST LIMITED: CPT | Mod: LT

## 2020-10-14 ENCOUNTER — HOSPITAL ENCOUNTER (OUTPATIENT)
Dept: LAB | Facility: MEDICAL CENTER | Age: 50
End: 2020-10-14
Attending: INTERNAL MEDICINE
Payer: COMMERCIAL

## 2020-10-14 DIAGNOSIS — D72.10 EOSINOPHILIA: ICD-10-CM

## 2020-10-14 DIAGNOSIS — N18.30 CKD (CHRONIC KIDNEY DISEASE) STAGE 3, GFR 30-59 ML/MIN: ICD-10-CM

## 2020-10-14 DIAGNOSIS — E87.6 HYPOKALEMIA: ICD-10-CM

## 2020-10-14 DIAGNOSIS — D72.19 OTHER EOSINOPHILIA: ICD-10-CM

## 2020-10-14 DIAGNOSIS — F33.42 MAJOR DEPRESSIVE DISORDER, RECURRENT, IN FULL REMISSION (HCC): ICD-10-CM

## 2020-10-14 DIAGNOSIS — E61.1 IRON DEFICIENCY: ICD-10-CM

## 2020-10-14 DIAGNOSIS — Z78.0 POSTMENOPAUSE: ICD-10-CM

## 2020-10-14 DIAGNOSIS — R63.6 UNDERWEIGHT DUE TO INADEQUATE CALORIC INTAKE: ICD-10-CM

## 2020-10-14 LAB
25(OH)D3 SERPL-MCNC: 44 NG/ML (ref 30–100)
ALBUMIN SERPL BCP-MCNC: 4.5 G/DL (ref 3.2–4.9)
ALBUMIN/GLOB SERPL: 1.9 G/DL
ALP SERPL-CCNC: 69 U/L (ref 30–99)
ALT SERPL-CCNC: 18 U/L (ref 2–50)
ANION GAP SERPL CALC-SCNC: 14 MMOL/L (ref 7–16)
APPEARANCE UR: CLEAR
AST SERPL-CCNC: 19 U/L (ref 12–45)
BACTERIA #/AREA URNS HPF: NEGATIVE /HPF
BASOPHILS # BLD AUTO: 1.3 % (ref 0–1.8)
BASOPHILS # BLD: 0.1 K/UL (ref 0–0.12)
BILIRUB SERPL-MCNC: 0.2 MG/DL (ref 0.1–1.5)
BILIRUB UR QL STRIP.AUTO: NEGATIVE
BUN SERPL-MCNC: 29 MG/DL (ref 8–22)
CALCIUM SERPL-MCNC: 9.7 MG/DL (ref 8.5–10.5)
CHLORIDE SERPL-SCNC: 98 MMOL/L (ref 96–112)
CO2 SERPL-SCNC: 25 MMOL/L (ref 20–33)
COLOR UR: YELLOW
CREAT SERPL-MCNC: 1.48 MG/DL (ref 0.5–1.4)
EOSINOPHIL # BLD AUTO: 0.46 K/UL (ref 0–0.51)
EOSINOPHIL NFR BLD: 6.2 % (ref 0–6.9)
EPI CELLS #/AREA URNS HPF: NEGATIVE /HPF
ERYTHROCYTE [DISTWIDTH] IN BLOOD BY AUTOMATED COUNT: 44.1 FL (ref 35.9–50)
FASTING STATUS PATIENT QL REPORTED: NORMAL
FERRITIN SERPL-MCNC: 55.3 NG/ML (ref 10–291)
GLOBULIN SER CALC-MCNC: 2.4 G/DL (ref 1.9–3.5)
GLUCOSE SERPL-MCNC: 87 MG/DL (ref 65–99)
GLUCOSE UR STRIP.AUTO-MCNC: NEGATIVE MG/DL
HCT VFR BLD AUTO: 47.4 % (ref 37–47)
HGB BLD-MCNC: 15.5 G/DL (ref 12–16)
HYALINE CASTS #/AREA URNS LPF: ABNORMAL /LPF
IMM GRANULOCYTES # BLD AUTO: 0.02 K/UL (ref 0–0.11)
IMM GRANULOCYTES NFR BLD AUTO: 0.3 % (ref 0–0.9)
IRON SATN MFR SERPL: 23 % (ref 15–55)
IRON SERPL-MCNC: 64 UG/DL (ref 40–170)
KETONES UR STRIP.AUTO-MCNC: NEGATIVE MG/DL
LEUKOCYTE ESTERASE UR QL STRIP.AUTO: ABNORMAL
LYMPHOCYTES # BLD AUTO: 1.21 K/UL (ref 1–4.8)
LYMPHOCYTES NFR BLD: 16.2 % (ref 22–41)
MCH RBC QN AUTO: 31.4 PG (ref 27–33)
MCHC RBC AUTO-ENTMCNC: 32.7 G/DL (ref 33.6–35)
MCV RBC AUTO: 96.1 FL (ref 81.4–97.8)
MICRO URNS: ABNORMAL
MONOCYTES # BLD AUTO: 0.48 K/UL (ref 0–0.85)
MONOCYTES NFR BLD AUTO: 6.4 % (ref 0–13.4)
NEUTROPHILS # BLD AUTO: 5.18 K/UL (ref 2–7.15)
NEUTROPHILS NFR BLD: 69.6 % (ref 44–72)
NITRITE UR QL STRIP.AUTO: NEGATIVE
NRBC # BLD AUTO: 0 K/UL
NRBC BLD-RTO: 0 /100 WBC
PH UR STRIP.AUTO: 6 [PH] (ref 5–8)
PHOSPHATE SERPL-MCNC: 5 MG/DL (ref 2.5–4.5)
PLATELET # BLD AUTO: 306 K/UL (ref 164–446)
PMV BLD AUTO: 9 FL (ref 9–12.9)
POTASSIUM SERPL-SCNC: 3 MMOL/L (ref 3.6–5.5)
PROT SERPL-MCNC: 6.9 G/DL (ref 6–8.2)
PROT UR QL STRIP: NEGATIVE MG/DL
PTH-INTACT SERPL-MCNC: 63.6 PG/ML (ref 14–72)
RBC # BLD AUTO: 4.93 M/UL (ref 4.2–5.4)
RBC # URNS HPF: ABNORMAL /HPF
RBC UR QL AUTO: NEGATIVE
SODIUM SERPL-SCNC: 137 MMOL/L (ref 135–145)
SP GR UR STRIP.AUTO: 1.01
TIBC SERPL-MCNC: 275 UG/DL (ref 250–450)
TSH SERPL DL<=0.005 MIU/L-ACNC: 1.62 UIU/ML (ref 0.38–5.33)
UIBC SERPL-MCNC: 211 UG/DL (ref 110–370)
URATE SERPL-MCNC: 6.5 MG/DL (ref 1.9–8.2)
UROBILINOGEN UR STRIP.AUTO-MCNC: 0.2 MG/DL
WBC # BLD AUTO: 7.5 K/UL (ref 4.8–10.8)
WBC #/AREA URNS HPF: ABNORMAL /HPF

## 2020-10-14 PROCEDURE — 83540 ASSAY OF IRON: CPT

## 2020-10-14 PROCEDURE — 82306 VITAMIN D 25 HYDROXY: CPT

## 2020-10-14 PROCEDURE — 83550 IRON BINDING TEST: CPT

## 2020-10-14 PROCEDURE — 80053 COMPREHEN METABOLIC PANEL: CPT

## 2020-10-14 PROCEDURE — 82043 UR ALBUMIN QUANTITATIVE: CPT

## 2020-10-14 PROCEDURE — 84550 ASSAY OF BLOOD/URIC ACID: CPT

## 2020-10-14 PROCEDURE — 85025 COMPLETE CBC W/AUTO DIFF WBC: CPT

## 2020-10-14 PROCEDURE — 84100 ASSAY OF PHOSPHORUS: CPT

## 2020-10-14 PROCEDURE — 82570 ASSAY OF URINE CREATININE: CPT

## 2020-10-14 PROCEDURE — 82728 ASSAY OF FERRITIN: CPT

## 2020-10-14 PROCEDURE — 83970 ASSAY OF PARATHORMONE: CPT

## 2020-10-14 PROCEDURE — 36415 COLL VENOUS BLD VENIPUNCTURE: CPT

## 2020-10-14 PROCEDURE — 81001 URINALYSIS AUTO W/SCOPE: CPT

## 2020-10-14 PROCEDURE — 84443 ASSAY THYROID STIM HORMONE: CPT

## 2020-10-15 LAB
CREAT UR-MCNC: 75.63 MG/DL
MICROALBUMIN UR-MCNC: 1.8 MG/DL
MICROALBUMIN/CREAT UR: 24 MG/G (ref 0–30)

## 2020-10-16 ENCOUNTER — PATIENT MESSAGE (OUTPATIENT)
Dept: MEDICAL GROUP | Facility: MEDICAL CENTER | Age: 50
End: 2020-10-16

## 2020-10-16 DIAGNOSIS — E87.6 HYPOKALEMIA: ICD-10-CM

## 2020-10-20 RX ORDER — POTASSIUM CHLORIDE 20 MEQ/1
20 TABLET, EXTENDED RELEASE ORAL 2 TIMES DAILY
Qty: 180 TAB | Refills: 1 | Status: SHIPPED | OUTPATIENT
Start: 2020-10-20 | End: 2021-09-03

## 2020-12-20 DIAGNOSIS — Z23 NEED FOR VACCINATION: ICD-10-CM

## 2020-12-22 ENCOUNTER — APPOINTMENT (OUTPATIENT)
Dept: FAMILY PLANNING/WOMEN'S HEALTH CLINIC | Facility: IMMUNIZATION CENTER | Age: 50
End: 2020-12-22
Attending: FAMILY MEDICINE
Payer: COMMERCIAL

## 2020-12-22 PROCEDURE — 91300 PFIZER SARS-COV-2 VACCINE: CPT

## 2020-12-22 PROCEDURE — 0001A PFIZER SARS-COV-2 VACCINE: CPT

## 2020-12-23 ENCOUNTER — IMMUNIZATION (OUTPATIENT)
Dept: FAMILY PLANNING/WOMEN'S HEALTH CLINIC | Facility: IMMUNIZATION CENTER | Age: 50
End: 2020-12-23
Payer: COMMERCIAL

## 2020-12-23 DIAGNOSIS — Z23 ENCOUNTER FOR VACCINATION: Primary | ICD-10-CM

## 2021-01-12 ENCOUNTER — IMMUNIZATION (OUTPATIENT)
Dept: FAMILY PLANNING/WOMEN'S HEALTH CLINIC | Facility: IMMUNIZATION CENTER | Age: 51
End: 2021-01-12
Attending: FAMILY MEDICINE
Payer: COMMERCIAL

## 2021-01-12 DIAGNOSIS — Z23 ENCOUNTER FOR VACCINATION: Primary | ICD-10-CM

## 2021-01-12 PROCEDURE — 91300 PFIZER SARS-COV-2 VACCINE: CPT | Performed by: FAMILY MEDICINE

## 2021-01-12 PROCEDURE — 0002A PFIZER SARS-COV-2 VACCINE: CPT | Performed by: FAMILY MEDICINE

## 2021-08-30 DIAGNOSIS — F33.42 MAJOR DEPRESSIVE DISORDER, RECURRENT, IN FULL REMISSION (HCC): ICD-10-CM

## 2021-08-30 NOTE — LETTER
September 1, 2021        Lilian Nielson  [address] [place] [address]        Dear Lilian:    I just left you a message at [phone]. We have received a request from your pharmacy to refill your prescription(s). Your prescription(s) has been sent to your pharmacy! After careful review of your chart, we have noted you are due for an appointment. We request you call our scheduling team at [phone] at your earliest convenience and make an appointment. Please be advised that we cannot send any further refills without an appointment so please schedule accordingly before your medication runs out so that there is no lapse in therapy.     We are now offering virtual appointments as well that are conducted through your [email or web address] account and Zoom.  Your provider has also placed orders in the system for you. These will be fasting labs, 10-12 hours, water and medications are okay. If you use any [facility], please to bring your ID and Insurance Cards with you to your lab appointment.     We look forward to scheduling an appointment for you, so that we may provide you with the safest and most complete medical care.    Alexandra Mcdonald  Medical Assistant      Electronically Signed

## 2021-08-31 DIAGNOSIS — N18.32 STAGE 3B CHRONIC KIDNEY DISEASE: ICD-10-CM

## 2021-08-31 DIAGNOSIS — E87.6 HYPOKALEMIA: ICD-10-CM

## 2021-08-31 DIAGNOSIS — D72.19 OTHER EOSINOPHILIA: ICD-10-CM

## 2021-08-31 RX ORDER — FLUOXETINE HYDROCHLORIDE 40 MG/1
CAPSULE ORAL
Qty: 30 CAPSULE | Refills: 0 | Status: SHIPPED | OUTPATIENT
Start: 2021-08-31 | End: 2021-09-15

## 2021-08-31 NOTE — TELEPHONE ENCOUNTER
Please call patient to arrange follow up for future refills. Remind to do fasting labs before appt. Orders placed.

## 2021-09-01 ENCOUNTER — HOSPITAL ENCOUNTER (OUTPATIENT)
Dept: LAB | Facility: MEDICAL CENTER | Age: 51
End: 2021-09-01
Attending: INTERNAL MEDICINE
Payer: COMMERCIAL

## 2021-09-01 DIAGNOSIS — D72.19 OTHER EOSINOPHILIA: ICD-10-CM

## 2021-09-01 DIAGNOSIS — E87.6 HYPOKALEMIA: ICD-10-CM

## 2021-09-01 DIAGNOSIS — N18.32 STAGE 3B CHRONIC KIDNEY DISEASE: ICD-10-CM

## 2021-09-01 LAB
25(OH)D3 SERPL-MCNC: 45 NG/ML (ref 30–100)
ALBUMIN SERPL BCP-MCNC: 4.3 G/DL (ref 3.2–4.9)
ALBUMIN/GLOB SERPL: 2 G/DL
ALP SERPL-CCNC: 54 U/L (ref 30–99)
ALT SERPL-CCNC: 18 U/L (ref 2–50)
ANION GAP SERPL CALC-SCNC: 13 MMOL/L (ref 7–16)
AST SERPL-CCNC: 24 U/L (ref 12–45)
BASOPHILS # BLD AUTO: 1.6 % (ref 0–1.8)
BASOPHILS # BLD: 0.12 K/UL (ref 0–0.12)
BILIRUB SERPL-MCNC: 0.3 MG/DL (ref 0.1–1.5)
BUN SERPL-MCNC: 23 MG/DL (ref 8–22)
CALCIUM SERPL-MCNC: 10.2 MG/DL (ref 8.5–10.5)
CHLORIDE SERPL-SCNC: 100 MMOL/L (ref 96–112)
CO2 SERPL-SCNC: 26 MMOL/L (ref 20–33)
CREAT SERPL-MCNC: 1.42 MG/DL (ref 0.5–1.4)
EOSINOPHIL # BLD AUTO: 0.74 K/UL (ref 0–0.51)
EOSINOPHIL NFR BLD: 10.1 % (ref 0–6.9)
ERYTHROCYTE [DISTWIDTH] IN BLOOD BY AUTOMATED COUNT: 45 FL (ref 35.9–50)
FERRITIN SERPL-MCNC: 45.4 NG/ML (ref 10–291)
GLOBULIN SER CALC-MCNC: 2.2 G/DL (ref 1.9–3.5)
GLUCOSE SERPL-MCNC: 83 MG/DL (ref 65–99)
HCT VFR BLD AUTO: 44.1 % (ref 37–47)
HGB BLD-MCNC: 14.4 G/DL (ref 12–16)
IMM GRANULOCYTES # BLD AUTO: 0.02 K/UL (ref 0–0.11)
IMM GRANULOCYTES NFR BLD AUTO: 0.3 % (ref 0–0.9)
IRON SATN MFR SERPL: 54 % (ref 15–55)
IRON SERPL-MCNC: 147 UG/DL (ref 40–170)
LYMPHOCYTES # BLD AUTO: 1.67 K/UL (ref 1–4.8)
LYMPHOCYTES NFR BLD: 22.7 % (ref 22–41)
MCH RBC QN AUTO: 31.5 PG (ref 27–33)
MCHC RBC AUTO-ENTMCNC: 32.7 G/DL (ref 33.6–35)
MCV RBC AUTO: 96.5 FL (ref 81.4–97.8)
MONOCYTES # BLD AUTO: 0.52 K/UL (ref 0–0.85)
MONOCYTES NFR BLD AUTO: 7.1 % (ref 0–13.4)
NEUTROPHILS # BLD AUTO: 4.29 K/UL (ref 2–7.15)
NEUTROPHILS NFR BLD: 58.2 % (ref 44–72)
NRBC # BLD AUTO: 0 K/UL
NRBC BLD-RTO: 0 /100 WBC
PHOSPHATE SERPL-MCNC: 4.3 MG/DL (ref 2.5–4.5)
PLATELET # BLD AUTO: 324 K/UL (ref 164–446)
PMV BLD AUTO: 9.5 FL (ref 9–12.9)
POTASSIUM SERPL-SCNC: 2.7 MMOL/L (ref 3.6–5.5)
PROT SERPL-MCNC: 6.5 G/DL (ref 6–8.2)
PTH-INTACT SERPL-MCNC: 22.8 PG/ML (ref 14–72)
RBC # BLD AUTO: 4.57 M/UL (ref 4.2–5.4)
SODIUM SERPL-SCNC: 139 MMOL/L (ref 135–145)
TIBC SERPL-MCNC: 270 UG/DL (ref 250–450)
UIBC SERPL-MCNC: 123 UG/DL (ref 110–370)
URATE SERPL-MCNC: 8.3 MG/DL (ref 1.9–8.2)
WBC # BLD AUTO: 7.4 K/UL (ref 4.8–10.8)

## 2021-09-01 PROCEDURE — 83970 ASSAY OF PARATHORMONE: CPT

## 2021-09-01 PROCEDURE — 84100 ASSAY OF PHOSPHORUS: CPT

## 2021-09-01 PROCEDURE — 80053 COMPREHEN METABOLIC PANEL: CPT

## 2021-09-01 PROCEDURE — 84550 ASSAY OF BLOOD/URIC ACID: CPT

## 2021-09-01 PROCEDURE — 82306 VITAMIN D 25 HYDROXY: CPT

## 2021-09-01 PROCEDURE — 83550 IRON BINDING TEST: CPT

## 2021-09-01 PROCEDURE — 82043 UR ALBUMIN QUANTITATIVE: CPT

## 2021-09-01 PROCEDURE — 82570 ASSAY OF URINE CREATININE: CPT

## 2021-09-01 PROCEDURE — 85025 COMPLETE CBC W/AUTO DIFF WBC: CPT

## 2021-09-01 PROCEDURE — 36415 COLL VENOUS BLD VENIPUNCTURE: CPT

## 2021-09-01 PROCEDURE — 82728 ASSAY OF FERRITIN: CPT

## 2021-09-01 PROCEDURE — 83540 ASSAY OF IRON: CPT

## 2021-09-01 NOTE — TELEPHONE ENCOUNTER
Left message for pt to call back at [phone]. MyChart message sent and letter/labs mailed to pt.

## 2021-09-02 LAB
CREAT UR-MCNC: 87.57 MG/DL
MICROALBUMIN UR-MCNC: 1.9 MG/DL
MICROALBUMIN/CREAT UR: 22 MG/G (ref 0–30)

## 2021-09-03 DIAGNOSIS — E87.6 HYPOKALEMIA: ICD-10-CM

## 2021-09-03 RX ORDER — POTASSIUM CHLORIDE 20 MEQ/1
20 TABLET, EXTENDED RELEASE ORAL 2 TIMES DAILY
Qty: 180 TABLET | Refills: 1 | Status: SHIPPED | OUTPATIENT
Start: 2021-09-03 | End: 2021-09-15

## 2021-09-15 ENCOUNTER — OFFICE VISIT (OUTPATIENT)
Dept: MEDICAL GROUP | Facility: MEDICAL CENTER | Age: 51
End: 2021-09-15
Payer: COMMERCIAL

## 2021-09-15 VITALS
HEART RATE: 86 BPM | SYSTOLIC BLOOD PRESSURE: 100 MMHG | OXYGEN SATURATION: 96 % | DIASTOLIC BLOOD PRESSURE: 62 MMHG | TEMPERATURE: 99 F | WEIGHT: 104 LBS | HEIGHT: 72 IN | BODY MASS INDEX: 14.09 KG/M2

## 2021-09-15 DIAGNOSIS — G43.109 MIGRAINE WITH AURA AND WITHOUT STATUS MIGRAINOSUS, NOT INTRACTABLE: ICD-10-CM

## 2021-09-15 DIAGNOSIS — N18.32 STAGE 3B CHRONIC KIDNEY DISEASE: ICD-10-CM

## 2021-09-15 DIAGNOSIS — F33.42 MAJOR DEPRESSIVE DISORDER, RECURRENT, IN FULL REMISSION (HCC): ICD-10-CM

## 2021-09-15 DIAGNOSIS — E87.6 HYPOKALEMIA: ICD-10-CM

## 2021-09-15 DIAGNOSIS — Z23 NEED FOR VACCINATION: ICD-10-CM

## 2021-09-15 DIAGNOSIS — G44.209 TENSION HEADACHE: ICD-10-CM

## 2021-09-15 PROCEDURE — 99214 OFFICE O/P EST MOD 30 MIN: CPT | Mod: 25 | Performed by: INTERNAL MEDICINE

## 2021-09-15 PROCEDURE — 90750 HZV VACC RECOMBINANT IM: CPT | Performed by: INTERNAL MEDICINE

## 2021-09-15 PROCEDURE — 90471 IMMUNIZATION ADMIN: CPT | Performed by: INTERNAL MEDICINE

## 2021-09-15 RX ORDER — POTASSIUM CHLORIDE 20 MEQ/1
40 TABLET, EXTENDED RELEASE ORAL DAILY
Qty: 90 TABLET | Refills: 3 | Status: SHIPPED | OUTPATIENT
Start: 2021-09-15 | End: 2021-10-01 | Stop reason: SDUPTHER

## 2021-09-15 RX ORDER — METHOCARBAMOL 500 MG/1
TABLET, FILM COATED ORAL
Qty: 60 TABLET | Refills: 5 | Status: SHIPPED | OUTPATIENT
Start: 2021-09-15 | End: 2022-12-21 | Stop reason: SDUPTHER

## 2021-09-15 RX ORDER — FLUOXETINE HYDROCHLORIDE 40 MG/1
40 CAPSULE ORAL
Qty: 90 CAPSULE | Refills: 3 | Status: SHIPPED | OUTPATIENT
Start: 2021-09-15 | End: 2022-09-14 | Stop reason: SDUPTHER

## 2021-09-15 RX ORDER — SUMATRIPTAN 50 MG/1
TABLET, FILM COATED ORAL
Qty: 9 TABLET | Refills: 11 | Status: SHIPPED | OUTPATIENT
Start: 2021-09-15 | End: 2022-08-16

## 2021-09-15 ASSESSMENT — FIBROSIS 4 INDEX: FIB4 SCORE: 0.89

## 2021-09-15 ASSESSMENT — PATIENT HEALTH QUESTIONNAIRE - PHQ9
5. POOR APPETITE OR OVEREATING: SEVERAL DAYS
7. TROUBLE CONCENTRATING ON THINGS, SUCH AS READING THE NEWSPAPER OR WATCHING TELEVISION: NOT AT ALL
4. FEELING TIRED OR HAVING LITTLE ENERGY: NOT AT ALL
SUM OF ALL RESPONSES TO PHQ9 QUESTIONS 1 AND 2: 0
8. MOVING OR SPEAKING SO SLOWLY THAT OTHER PEOPLE COULD HAVE NOTICED. OR THE OPPOSITE, BEING SO FIGETY OR RESTLESS THAT YOU HAVE BEEN MOVING AROUND A LOT MORE THAN USUAL: NOT AT ALL
SUM OF ALL RESPONSES TO PHQ QUESTIONS 1-9: 1
1. LITTLE INTEREST OR PLEASURE IN DOING THINGS: NOT AT ALL
2. FEELING DOWN, DEPRESSED, IRRITABLE, OR HOPELESS: NOT AT ALL
6. FEELING BAD ABOUT YOURSELF - OR THAT YOU ARE A FAILURE OR HAVE LET YOURSELF OR YOUR FAMILY DOWN: NOT AL ALL
3. TROUBLE FALLING OR STAYING ASLEEP OR SLEEPING TOO MUCH: NOT AT ALL
9. THOUGHTS THAT YOU WOULD BE BETTER OFF DEAD, OR OF HURTING YOURSELF: NOT AT ALL

## 2021-09-15 NOTE — PROGRESS NOTES
Established Patient    Lilian presents today with the following:    CC: Follow-up for chronic medical issues    HPI:   Lilian is a 50 y.o. female who came in for above.    No health event over the past year.  Lost to follow-up with repeat blood tests for hypokalemia and did not find the message to increase K supplement after last yr's blood test so she has been taking only 20 meq daily. Current K came back 2.7. She has received message this time and increased it to bid.    Depression is stable with prozac.    Migraines still happen every few weeks, sumatriptan and robaxin still effective.    ROS:   As above    Patient Active Problem List    Diagnosis Date Noted   • Postmenopause 09/09/2020   • RAYA (generalized anxiety disorder) 09/09/2020   • Vitamin D insufficiency 08/20/2019   • Eosinophilia 08/20/2019   • Hypokalemia 08/20/2019   • Underweight due to inadequate caloric intake 04/26/2018   • Iron deficiency 04/26/2018   • CKD (chronic kidney disease) stage 3, GFR 30-59 ml/min ([facility]) 04/26/2018   • Neck muscle spasm 10/26/2017   • Major depressive disorder, recurrent, in full remission ([facility]) 12/10/2015   • Migraine with aura and without status migrainosus, not intractable 02/03/2014       Current Outpatient Medications   Medication Sig Dispense Refill   • fluoxetine (PROZAC) 40 MG capsule Take 1 Capsule by mouth every day. 90 Capsule 3   • SUMAtriptan (IMITREX) 50 MG Tab TAKE 1 TAB BY MOUTH ONCE DAILY AS NEEDED FOR MIGRAINE 9 Tablet 11   • methocarbamol (ROBAXIN) 500 MG Tab TAKE 1 TABLET BY MOUTH 2 TIMES A DAY AS NEEDED 60 Tablet 5   • potassium chloride SA (KDUR) 20 MEQ Tab CR Take 2 Tablets by mouth every day. 90 Tablet 3     No current facility-administered medications for this visit.         /62   Pulse 86   Temp 37.2 °C (99 °F) (Temporal)   Ht 1.829 m (6')   Wt 47.2 kg (104 lb)   SpO2 96%   BMI 14.10 kg/m²     Physical Exam  General: Alert and oriented, No apparent distress.  Neck: Supple. No  cervical or supraclavicular lymphadenopathy noted. Thyroid not enlarged.  Lungs: Clear to auscultation bilaterally without any wheezing, crepitations.  Cardiovascular: Regular rate and rhythm. No murmurs, rubs or gallops.  Abdomen: Bowel sound +, soft, non tender, no rebound or guarding, no palpable organomegaly  Extremities: No clubbing, cyanosis, edema.        Assessment and Plan    1. Stage 3b chronic kidney disease (HCC)  - stable. Since she cannot do labs frequently, monitor yearly.    2. Hypokalemia  Encouraged to repeat lab in 3 weeks to make sure 40 meq daily is enough. Check Mg.  - Basic Metabolic Panel; Future  - MAGNESIUM; Future  - potassium chloride SA (KDUR) 20 MEQ Tab CR; Take 2 Tablets by mouth every day.  Dispense: 90 Tablet; Refill: 3    3. Major depressive disorder, recurrent, in full remission (HCC)  - stable. continue fluoxetine (PROZAC) 40 MG capsule; Take 1 Capsule by mouth every day.  Dispense: 90 Capsule; Refill: 3    4. Migraine with aura and without status migrainosus, not intractable  Stable. Continue SUMAtriptan (IMITREX) 50 MG Tab; TAKE 1 TAB BY MOUTH ONCE DAILY AS NEEDED FOR MIGRAINE  Dispense: 9 Tablet; Refill: 11    5. Tension headache  Stable. Continue methocarbamol (ROBAXIN) 500 MG Tab; TAKE 1 TABLET BY MOUTH 2 TIMES A DAY AS NEEDED  Dispense: 60 Tablet; Refill: 5    6. Need for vaccination  - Shingles Vaccine (Shingrix)        Return for PAP.         Signed by: Shana Romo M.D.

## 2021-09-30 ENCOUNTER — HOSPITAL ENCOUNTER (OUTPATIENT)
Dept: LAB | Facility: MEDICAL CENTER | Age: 51
End: 2021-09-30
Attending: INTERNAL MEDICINE
Payer: COMMERCIAL

## 2021-09-30 DIAGNOSIS — E87.6 HYPOKALEMIA: ICD-10-CM

## 2021-09-30 LAB
ANION GAP SERPL CALC-SCNC: 12 MMOL/L (ref 7–16)
BUN SERPL-MCNC: 24 MG/DL (ref 8–22)
CALCIUM SERPL-MCNC: 9.3 MG/DL (ref 8.5–10.5)
CHLORIDE SERPL-SCNC: 104 MMOL/L (ref 96–112)
CO2 SERPL-SCNC: 25 MMOL/L (ref 20–33)
CREAT SERPL-MCNC: 1.43 MG/DL (ref 0.5–1.4)
GLUCOSE SERPL-MCNC: 97 MG/DL (ref 65–99)
MAGNESIUM SERPL-MCNC: 2.2 MG/DL (ref 1.5–2.5)
POTASSIUM SERPL-SCNC: 3.3 MMOL/L (ref 3.6–5.5)
SODIUM SERPL-SCNC: 141 MMOL/L (ref 135–145)

## 2021-09-30 PROCEDURE — 36415 COLL VENOUS BLD VENIPUNCTURE: CPT

## 2021-09-30 PROCEDURE — 80048 BASIC METABOLIC PNL TOTAL CA: CPT

## 2021-09-30 PROCEDURE — 83735 ASSAY OF MAGNESIUM: CPT

## 2021-10-01 DIAGNOSIS — E87.6 HYPOKALEMIA: ICD-10-CM

## 2021-10-01 RX ORDER — POTASSIUM CHLORIDE 20 MEQ/1
20 TABLET, EXTENDED RELEASE ORAL 3 TIMES DAILY
Qty: 270 TABLET | Refills: 3 | Status: SHIPPED | OUTPATIENT
Start: 2021-10-01 | End: 2022-12-06

## 2021-10-26 ENCOUNTER — HOSPITAL ENCOUNTER (OUTPATIENT)
Dept: LAB | Facility: MEDICAL CENTER | Age: 51
End: 2021-10-26
Attending: INTERNAL MEDICINE
Payer: COMMERCIAL

## 2021-10-26 DIAGNOSIS — E87.6 HYPOKALEMIA: ICD-10-CM

## 2021-10-26 LAB — POTASSIUM SERPL-SCNC: 3.2 MMOL/L (ref 3.6–5.5)

## 2021-10-26 PROCEDURE — 84132 ASSAY OF SERUM POTASSIUM: CPT

## 2021-10-26 PROCEDURE — 36415 COLL VENOUS BLD VENIPUNCTURE: CPT

## 2022-09-14 DIAGNOSIS — F33.42 MAJOR DEPRESSIVE DISORDER, RECURRENT, IN FULL REMISSION (HCC): ICD-10-CM

## 2022-09-14 RX ORDER — FLUOXETINE HYDROCHLORIDE 40 MG/1
40 CAPSULE ORAL
Qty: 60 CAPSULE | Refills: 0 | Status: SHIPPED | OUTPATIENT
Start: 2022-09-14 | End: 2022-09-14

## 2022-09-14 RX ORDER — FLUOXETINE HYDROCHLORIDE 40 MG/1
40 CAPSULE ORAL
Qty: 60 CAPSULE | Refills: 0 | Status: SHIPPED | OUTPATIENT
Start: 2022-09-14 | End: 2022-09-30

## 2022-09-27 DIAGNOSIS — G43.109 MIGRAINE WITH AURA AND WITHOUT STATUS MIGRAINOSUS, NOT INTRACTABLE: ICD-10-CM

## 2022-09-30 DIAGNOSIS — G43.109 MIGRAINE WITH AURA AND WITHOUT STATUS MIGRAINOSUS, NOT INTRACTABLE: ICD-10-CM

## 2022-09-30 RX ORDER — SUMATRIPTAN 50 MG/1
TABLET, FILM COATED ORAL
Qty: 9 TABLET | Refills: 0 | Status: SHIPPED | OUTPATIENT
Start: 2022-09-30 | End: 2022-12-21 | Stop reason: SDUPTHER

## 2022-10-04 RX ORDER — SUMATRIPTAN 50 MG/1
50 TABLET, FILM COATED ORAL
Qty: 9 TABLET | Refills: 0 | Status: SHIPPED | OUTPATIENT
Start: 2022-10-04 | End: 2022-12-20

## 2022-12-03 DIAGNOSIS — E87.6 HYPOKALEMIA: ICD-10-CM

## 2022-12-03 DIAGNOSIS — Z00.00 WELL ADULT EXAM: ICD-10-CM

## 2022-12-03 DIAGNOSIS — N18.32 STAGE 3B CHRONIC KIDNEY DISEASE: ICD-10-CM

## 2022-12-03 DIAGNOSIS — E55.9 VITAMIN D INSUFFICIENCY: ICD-10-CM

## 2022-12-06 RX ORDER — POTASSIUM CHLORIDE 1500 MG/1
40 TABLET, EXTENDED RELEASE ORAL 3 TIMES DAILY
Qty: 90 TABLET | Refills: 0 | Status: SHIPPED | OUTPATIENT
Start: 2022-12-06 | End: 2022-12-21 | Stop reason: SDUPTHER

## 2022-12-14 ENCOUNTER — HOSPITAL ENCOUNTER (OUTPATIENT)
Dept: LAB | Facility: MEDICAL CENTER | Age: 52
End: 2022-12-14
Attending: INTERNAL MEDICINE
Payer: COMMERCIAL

## 2022-12-14 DIAGNOSIS — E55.9 VITAMIN D INSUFFICIENCY: ICD-10-CM

## 2022-12-14 DIAGNOSIS — Z00.00 WELL ADULT EXAM: ICD-10-CM

## 2022-12-14 DIAGNOSIS — N18.32 STAGE 3B CHRONIC KIDNEY DISEASE: ICD-10-CM

## 2022-12-14 DIAGNOSIS — E87.6 HYPOKALEMIA: ICD-10-CM

## 2022-12-14 LAB
25(OH)D3 SERPL-MCNC: 48 NG/ML (ref 30–100)
ALBUMIN SERPL BCP-MCNC: 5 G/DL (ref 3.2–4.9)
ALBUMIN/GLOB SERPL: 2.2 G/DL
ALP SERPL-CCNC: 82 U/L (ref 30–99)
ALT SERPL-CCNC: 20 U/L (ref 2–50)
ANION GAP SERPL CALC-SCNC: 12 MMOL/L (ref 7–16)
AST SERPL-CCNC: 28 U/L (ref 12–45)
BASOPHILS # BLD AUTO: 1.7 % (ref 0–1.8)
BASOPHILS # BLD: 0.13 K/UL (ref 0–0.12)
BILIRUB SERPL-MCNC: 0.3 MG/DL (ref 0.1–1.5)
BUN SERPL-MCNC: 31 MG/DL (ref 8–22)
CALCIUM ALBUM COR SERPL-MCNC: 8.9 MG/DL (ref 8.5–10.5)
CALCIUM SERPL-MCNC: 9.7 MG/DL (ref 8.5–10.5)
CHLORIDE SERPL-SCNC: 102 MMOL/L (ref 96–112)
CHOLEST SERPL-MCNC: 218 MG/DL (ref 100–199)
CO2 SERPL-SCNC: 26 MMOL/L (ref 20–33)
CREAT SERPL-MCNC: 1.72 MG/DL (ref 0.5–1.4)
CREAT UR-MCNC: 72.96 MG/DL
EOSINOPHIL # BLD AUTO: 0.73 K/UL (ref 0–0.51)
EOSINOPHIL NFR BLD: 9.6 % (ref 0–6.9)
ERYTHROCYTE [DISTWIDTH] IN BLOOD BY AUTOMATED COUNT: 46.5 FL (ref 35.9–50)
FERRITIN SERPL-MCNC: 35.1 NG/ML (ref 10–291)
GFR SERPLBLD CREATININE-BSD FMLA CKD-EPI: 35 ML/MIN/1.73 M 2
GLOBULIN SER CALC-MCNC: 2.3 G/DL (ref 1.9–3.5)
GLUCOSE SERPL-MCNC: 86 MG/DL (ref 65–99)
HCT VFR BLD AUTO: 45.3 % (ref 37–47)
HDLC SERPL-MCNC: 85 MG/DL
HGB BLD-MCNC: 14.8 G/DL (ref 12–16)
IMM GRANULOCYTES # BLD AUTO: 0.02 K/UL (ref 0–0.11)
IMM GRANULOCYTES NFR BLD AUTO: 0.3 % (ref 0–0.9)
IRON SATN MFR SERPL: 42 % (ref 15–55)
IRON SERPL-MCNC: 134 UG/DL (ref 40–170)
LDLC SERPL CALC-MCNC: 122 MG/DL
LYMPHOCYTES # BLD AUTO: 1.36 K/UL (ref 1–4.8)
LYMPHOCYTES NFR BLD: 18 % (ref 22–41)
MAGNESIUM SERPL-MCNC: 2.4 MG/DL (ref 1.5–2.5)
MCH RBC QN AUTO: 31.7 PG (ref 27–33)
MCHC RBC AUTO-ENTMCNC: 32.7 G/DL (ref 33.6–35)
MCV RBC AUTO: 97 FL (ref 81.4–97.8)
MICROALBUMIN UR-MCNC: 4.2 MG/DL
MICROALBUMIN/CREAT UR: 58 MG/G (ref 0–30)
MONOCYTES # BLD AUTO: 0.49 K/UL (ref 0–0.85)
MONOCYTES NFR BLD AUTO: 6.5 % (ref 0–13.4)
NEUTROPHILS # BLD AUTO: 4.84 K/UL (ref 2–7.15)
NEUTROPHILS NFR BLD: 63.9 % (ref 44–72)
NRBC # BLD AUTO: 0 K/UL
NRBC BLD-RTO: 0 /100 WBC
PLATELET # BLD AUTO: 387 K/UL (ref 164–446)
PMV BLD AUTO: 9.4 FL (ref 9–12.9)
POTASSIUM SERPL-SCNC: 3.2 MMOL/L (ref 3.6–5.5)
PROT SERPL-MCNC: 7.3 G/DL (ref 6–8.2)
PTH-INTACT SERPL-MCNC: 64.7 PG/ML (ref 14–72)
RBC # BLD AUTO: 4.67 M/UL (ref 4.2–5.4)
SODIUM SERPL-SCNC: 140 MMOL/L (ref 135–145)
TIBC SERPL-MCNC: 322 UG/DL (ref 250–450)
TRIGL SERPL-MCNC: 57 MG/DL (ref 0–149)
TSH SERPL DL<=0.005 MIU/L-ACNC: 2.45 UIU/ML (ref 0.38–5.33)
UIBC SERPL-MCNC: 188 UG/DL (ref 110–370)
WBC # BLD AUTO: 7.6 K/UL (ref 4.8–10.8)

## 2022-12-14 PROCEDURE — 80053 COMPREHEN METABOLIC PANEL: CPT

## 2022-12-14 PROCEDURE — 84443 ASSAY THYROID STIM HORMONE: CPT

## 2022-12-14 PROCEDURE — 83735 ASSAY OF MAGNESIUM: CPT

## 2022-12-14 PROCEDURE — 85025 COMPLETE CBC W/AUTO DIFF WBC: CPT

## 2022-12-14 PROCEDURE — 36415 COLL VENOUS BLD VENIPUNCTURE: CPT

## 2022-12-14 PROCEDURE — 82043 UR ALBUMIN QUANTITATIVE: CPT

## 2022-12-14 PROCEDURE — 82784 ASSAY IGA/IGD/IGG/IGM EACH: CPT

## 2022-12-14 PROCEDURE — 82570 ASSAY OF URINE CREATININE: CPT

## 2022-12-14 PROCEDURE — 82088 ASSAY OF ALDOSTERONE: CPT

## 2022-12-14 PROCEDURE — 83540 ASSAY OF IRON: CPT

## 2022-12-14 PROCEDURE — 82306 VITAMIN D 25 HYDROXY: CPT

## 2022-12-14 PROCEDURE — 83970 ASSAY OF PARATHORMONE: CPT

## 2022-12-14 PROCEDURE — 84244 ASSAY OF RENIN: CPT

## 2022-12-14 PROCEDURE — 82728 ASSAY OF FERRITIN: CPT

## 2022-12-14 PROCEDURE — 86364 TISS TRNSGLTMNASE EA IG CLAS: CPT

## 2022-12-14 PROCEDURE — 83550 IRON BINDING TEST: CPT

## 2022-12-14 PROCEDURE — 80061 LIPID PANEL: CPT

## 2022-12-15 DIAGNOSIS — F33.42 MAJOR DEPRESSIVE DISORDER, RECURRENT, IN FULL REMISSION (HCC): ICD-10-CM

## 2022-12-16 LAB
ALDOST SERPL-MCNC: 18 NG/DL
IGA SERPL-MCNC: 94 MG/DL (ref 68–408)

## 2022-12-17 LAB
RENIN PLAS-CCNC: 8.2 NG/ML/HR
TTG IGA SER IA-ACNC: <2 U/ML (ref 0–3)

## 2022-12-19 DIAGNOSIS — G43.109 MIGRAINE WITH AURA AND WITHOUT STATUS MIGRAINOSUS, NOT INTRACTABLE: ICD-10-CM

## 2022-12-19 NOTE — TELEPHONE ENCOUNTER
Received request via: Pharmacy    Was the patient seen in the last year in this department? No    Does the patient have an active prescription (recently filled or refills available) for medication(s) requested? No    Does the patient have MCFP Plus and need 100 day supply (blood pressure, diabetes and cholesterol meds only)? Patient does not have SCP

## 2022-12-20 RX ORDER — FLUOXETINE HYDROCHLORIDE 40 MG/1
CAPSULE ORAL
Qty: 30 CAPSULE | Refills: 0 | Status: SHIPPED | OUTPATIENT
Start: 2022-12-20 | End: 2022-12-21 | Stop reason: SDUPTHER

## 2022-12-20 RX ORDER — SUMATRIPTAN 50 MG/1
50 TABLET, FILM COATED ORAL
Qty: 9 TABLET | Refills: 0 | Status: SHIPPED | OUTPATIENT
Start: 2022-12-20 | End: 2022-12-21

## 2022-12-21 ENCOUNTER — OFFICE VISIT (OUTPATIENT)
Dept: MEDICAL GROUP | Facility: MEDICAL CENTER | Age: 52
End: 2022-12-21
Payer: COMMERCIAL

## 2022-12-21 VITALS
BODY MASS INDEX: 13.95 KG/M2 | WEIGHT: 103 LBS | SYSTOLIC BLOOD PRESSURE: 90 MMHG | RESPIRATION RATE: 16 BRPM | OXYGEN SATURATION: 99 % | DIASTOLIC BLOOD PRESSURE: 60 MMHG | HEIGHT: 72 IN | HEART RATE: 71 BPM | TEMPERATURE: 97.8 F

## 2022-12-21 DIAGNOSIS — G44.209 TENSION HEADACHE: ICD-10-CM

## 2022-12-21 DIAGNOSIS — N18.32 STAGE 3B CHRONIC KIDNEY DISEASE: ICD-10-CM

## 2022-12-21 DIAGNOSIS — E78.00 PURE HYPERCHOLESTEROLEMIA: ICD-10-CM

## 2022-12-21 DIAGNOSIS — Z12.11 COLON CANCER SCREENING: ICD-10-CM

## 2022-12-21 DIAGNOSIS — Z23 NEED FOR VACCINATION: ICD-10-CM

## 2022-12-21 DIAGNOSIS — R79.89 HIGH SERUM RENIN: ICD-10-CM

## 2022-12-21 DIAGNOSIS — G43.109 MIGRAINE WITH AURA AND WITHOUT STATUS MIGRAINOSUS, NOT INTRACTABLE: ICD-10-CM

## 2022-12-21 DIAGNOSIS — F33.42 MAJOR DEPRESSIVE DISORDER, RECURRENT, IN FULL REMISSION (HCC): ICD-10-CM

## 2022-12-21 DIAGNOSIS — Z12.31 ENCOUNTER FOR SCREENING MAMMOGRAM FOR BREAST CANCER: ICD-10-CM

## 2022-12-21 DIAGNOSIS — E87.6 HYPOKALEMIA: ICD-10-CM

## 2022-12-21 DIAGNOSIS — E61.1 IRON DEFICIENCY: ICD-10-CM

## 2022-12-21 PROCEDURE — 90471 IMMUNIZATION ADMIN: CPT | Performed by: INTERNAL MEDICINE

## 2022-12-21 PROCEDURE — 99214 OFFICE O/P EST MOD 30 MIN: CPT | Mod: 25 | Performed by: INTERNAL MEDICINE

## 2022-12-21 PROCEDURE — 90714 TD VACC NO PRESV 7 YRS+ IM: CPT | Performed by: INTERNAL MEDICINE

## 2022-12-21 RX ORDER — SUMATRIPTAN 50 MG/1
TABLET, FILM COATED ORAL
Qty: 9 TABLET | Refills: 11 | Status: SHIPPED | OUTPATIENT
Start: 2022-12-21 | End: 2023-12-26

## 2022-12-21 RX ORDER — POTASSIUM CHLORIDE 1500 MG/1
40 TABLET, EXTENDED RELEASE ORAL 3 TIMES DAILY
Qty: 540 TABLET | Refills: 1 | Status: SHIPPED | OUTPATIENT
Start: 2022-12-21

## 2022-12-21 RX ORDER — METHOCARBAMOL 500 MG/1
TABLET, FILM COATED ORAL
Qty: 60 TABLET | Refills: 5 | Status: SHIPPED | OUTPATIENT
Start: 2022-12-21

## 2022-12-21 RX ORDER — FLUOXETINE HYDROCHLORIDE 40 MG/1
CAPSULE ORAL
Qty: 90 CAPSULE | Refills: 3 | Status: SHIPPED | OUTPATIENT
Start: 2022-12-21 | End: 2023-12-29

## 2022-12-21 ASSESSMENT — FIBROSIS 4 INDEX: FIB4 SCORE: 0.86

## 2022-12-21 ASSESSMENT — PATIENT HEALTH QUESTIONNAIRE - PHQ9: CLINICAL INTERPRETATION OF PHQ2 SCORE: 0

## 2022-12-21 NOTE — PROGRESS NOTES
Established Patient    Lilian presents today with the following:    CC: Follow-up for chronic medical problems    HPI:   Lilian is a 52 y.o. female who came in for above.    She has been doing well without any new concerns.  She is here mainly for medication refill.  She did labs recently.      ROS:   As above    Patient Active Problem List    Diagnosis Date Noted    Postmenopause 09/09/2020    RAYA (generalized anxiety disorder) 09/09/2020    Vitamin D insufficiency 08/20/2019    Eosinophilia 08/20/2019    Hypokalemia 08/20/2019    Underweight due to inadequate caloric intake 04/26/2018    Iron deficiency 04/26/2018    CKD (chronic kidney disease) stage 3, GFR 30-59 ml/min ([facility]) 04/26/2018    Neck muscle spasm 10/26/2017    Major depressive disorder, recurrent, in full remission ([facility]) 12/10/2015    Migraine with aura and without status migrainosus, not intractable 02/03/2014       Current Outpatient Medications   Medication Sig Dispense Refill    SUMAtriptan (IMITREX) 50 MG Tab TAKE 1 TAB BY MOUTH ONCE DAILY AS NEEDED FOR MIGRAINE 9 Tablet 11    potassium Chloride ER (K-TAB) 20 MEQ Tab CR tablet Take 2 Tablets by mouth 3 times a day. 540 Tablet 1    fluoxetine (PROZAC) 40 MG capsule TAKE 1 CAPSULE BY MOUTH EVERY DAY. 90 Capsule 3    methocarbamol (ROBAXIN) 500 MG Tab TAKE 1 TABLET BY MOUTH 2 TIMES A DAY AS NEEDED 60 Tablet 5     No current facility-administered medications for this visit.         BP 90/60 (BP Location: Left arm, Patient Position: Sitting, BP Cuff Size: Adult)   Pulse 71   Temp 36.6 °C (97.8 °F) (Temporal)   Resp 16   Ht 1.829 m (6')   Wt 46.7 kg (103 lb)   LMP 04/25/2019 (Within Days)   SpO2 99%   BMI 13.97 kg/m²     Physical Exam  General: Alert and oriented, No apparent distress.  Neck: Supple. No cervical or supraclavicular lymphadenopathy noted. Thyroid not enlarged.  Lungs: Clear to auscultation bilaterally without any wheezing, crepitations.  Cardiovascular: Regular rate and rhythm. No  murmurs, rubs or gallops.  Abdomen: Bowel sound +, soft, non tender, no rebound or guarding, no palpable organomegaly  Extremities: No clubbing, cyanosis, edema.      Assessment and Plan    1. Stage 3b chronic kidney disease (HCC)  Slight decline in GFR and increasing creatinine.  No identifiable cause for decline. Recommended to revisit nephrologist.   - US-RENAL; Future  - Referral to Nephrology    2. High serum renin  Renin and aldosterone level was checked due to refractory hypokalemia.  She does not have high blood pressure though.  Renal level came back high.  Recommended seeing nephrologist for further opinion.  - Referral to Nephrology    3. Hypokalemia  Have not been able to get potassium up above 4. Slightly low at 3.2 for years.  Continue current dose of supplement for now.  - potassium Chloride ER (K-TAB) 20 MEQ Tab CR tablet; Take 2 Tablets by mouth 3 times a day.  Dispense: 540 Tablet; Refill: 1    4. Iron deficiency  Resolved.  No longer needing iron supplement.  Stable iron levels.    5. Migraine with aura and without status migrainosus, not intractable  Stable with occasional use of sumatriptan.  - SUMAtriptan (IMITREX) 50 MG Tab; TAKE 1 TAB BY MOUTH ONCE DAILY AS NEEDED FOR MIGRAINE  Dispense: 9 Tablet; Refill: 11    6. Tension headache  - methocarbamol (ROBAXIN) 500 MG Tab; TAKE 1 TABLET BY MOUTH 2 TIMES A DAY AS NEEDED  Dispense: 60 Tablet; Refill: 5    7. Major depressive disorder, recurrent, in full remission (HCC)  Stable  - fluoxetine (PROZAC) 40 MG capsule; TAKE 1 CAPSULE BY MOUTH EVERY DAY.  Dispense: 90 Capsule; Refill: 3    8. Pure hypercholesterolemia  The 10-year ASCVD risk score (Valdo DK, et al., 2019) is: 0.6%  -Mild LDL elevation.  Monitor once a year.    9. Colon cancer screening  - COLOGUARD (FIT DNA)    10. Encounter for screening mammogram for breast cancer  - MA-SCREENING MAMMO BILAT W/TOMOSYNTHESIS W/CAD; Future    11. Need for vaccination  - TD Preservative Free =>6yo  IM        Return for PAP         Signed by: Shana Romo M.D.

## 2023-01-19 ENCOUNTER — HOSPITAL ENCOUNTER (OUTPATIENT)
Dept: RADIOLOGY | Facility: MEDICAL CENTER | Age: 53
End: 2023-01-19
Attending: INTERNAL MEDICINE
Payer: COMMERCIAL

## 2023-01-19 DIAGNOSIS — Z12.31 ENCOUNTER FOR SCREENING MAMMOGRAM FOR BREAST CANCER: ICD-10-CM

## 2023-01-19 DIAGNOSIS — N18.32 STAGE 3B CHRONIC KIDNEY DISEASE: ICD-10-CM

## 2023-01-19 PROCEDURE — 76775 US EXAM ABDO BACK WALL LIM: CPT

## 2023-01-19 PROCEDURE — 77063 BREAST TOMOSYNTHESIS BI: CPT

## 2023-03-14 ENCOUNTER — OFFICE VISIT (OUTPATIENT)
Dept: NEPHROLOGY | Facility: MEDICAL CENTER | Age: 53
End: 2023-03-14
Payer: COMMERCIAL

## 2023-03-14 VITALS
DIASTOLIC BLOOD PRESSURE: 74 MMHG | WEIGHT: 102 LBS | SYSTOLIC BLOOD PRESSURE: 110 MMHG | HEART RATE: 93 BPM | OXYGEN SATURATION: 97 % | HEIGHT: 72 IN | TEMPERATURE: 98.9 F | BODY MASS INDEX: 13.82 KG/M2

## 2023-03-14 DIAGNOSIS — E87.6 HYPOKALEMIA: ICD-10-CM

## 2023-03-14 DIAGNOSIS — N18.32 STAGE 3B CHRONIC KIDNEY DISEASE: ICD-10-CM

## 2023-03-14 PROCEDURE — 99204 OFFICE O/P NEW MOD 45 MIN: CPT | Performed by: INTERNAL MEDICINE

## 2023-03-14 RX ORDER — SPIRONOLACTONE 25 MG/1
25 TABLET ORAL DAILY
Qty: 30 TABLET | Refills: 3 | Status: SHIPPED | OUTPATIENT
Start: 2023-03-14 | End: 2023-04-06

## 2023-03-14 ASSESSMENT — ENCOUNTER SYMPTOMS
SHORTNESS OF BREATH: 0
CHILLS: 0
VOMITING: 0
FEVER: 0
NAUSEA: 0
COUGH: 0

## 2023-03-14 ASSESSMENT — FIBROSIS 4 INDEX: FIB4 SCORE: 0.86

## 2023-03-14 NOTE — PROGRESS NOTES
Subjective     Lilian Nielson is a 52 y.o. female who presents with Chronic Kidney Disease            The patient is a pleasant 52-year-old lady with past medical history significant for chronic kidney disease stage III, creatinine has been elevated and fluctuating for the last 5 years, patient was taking high dose of NSAIDs few years back, however no NSAIDs recently.  She also has a chronic hypokalemia, the etiology of which is not very clear.  Patient is very thin lady, no recent use of diuretic  Recent analysis showed high urine activity  She had a renal ultrasound which I reviewed the image with the patient, it showed right kidney measures 7.5 cm, also ultrasound low suspicion for medullary sponge kidney    Chronic Kidney Disease  This is a chronic problem. The current episode started more than 1 year ago. The problem occurs constantly. The problem has been waxing and waning. Pertinent negatives include no chest pain, chills, coughing, fever, nausea, urinary symptoms or vomiting.     Review of Systems   Constitutional:  Negative for chills, fever and malaise/fatigue.   Respiratory:  Negative for cough and shortness of breath.    Cardiovascular:  Negative for chest pain and leg swelling.   Gastrointestinal:  Negative for nausea and vomiting.   Genitourinary:  Negative for dysuria, frequency and urgency.            Objective     /74 (BP Location: Left arm, Patient Position: Sitting, BP Cuff Size: Adult)   Pulse 93   Temp 37.2 °C (98.9 °F) (Temporal)   Ht 1.829 m (6')   Wt 46.3 kg (102 lb)   LMP 04/25/2019 (Within Days)   SpO2 97%   BMI 13.83 kg/m²      Physical Exam  Vitals and nursing note reviewed.   Constitutional:       General: She is awake. She is not in acute distress.     Appearance: She is well-developed. She is not ill-appearing or diaphoretic.   HENT:      Head: Normocephalic and atraumatic.      Right Ear: External ear normal.      Left Ear: External ear normal.      Nose: Nose normal. No  rhinorrhea.      Mouth/Throat:      Pharynx: No oropharyngeal exudate or posterior oropharyngeal erythema.   Eyes:      General: No scleral icterus.        Right eye: No discharge.         Left eye: No discharge.      Conjunctiva/sclera: Conjunctivae normal.   Neck:      Vascular: No carotid bruit.   Cardiovascular:      Rate and Rhythm: Normal rate and regular rhythm.      Heart sounds: No murmur heard.  Pulmonary:      Effort: Pulmonary effort is normal. No respiratory distress.      Breath sounds: Normal breath sounds.   Abdominal:      General: Abdomen is flat. There is no distension.      Palpations: Abdomen is soft. There is no mass.   Musculoskeletal:         General: No tenderness.      Cervical back: No rigidity. No muscular tenderness.      Right lower leg: No edema.      Left lower leg: No edema.   Skin:     General: Skin is warm and dry.      Coloration: Skin is not jaundiced.   Neurological:      General: No focal deficit present.      Mental Status: She is alert and oriented to person, place, and time. Mental status is at baseline.   Psychiatric:         Mood and Affect: Mood normal.         Behavior: Behavior normal.         Thought Content: Thought content normal.     Past Medical History:   Diagnosis Date    Amenorrhea     X 9yrs    Migraine 2011     Social History     Socioeconomic History    Marital status:      Spouse name: Not on file    Number of children: Not on file    Years of education: Not on file    Highest education level: Not on file   Occupational History    Not on file   Tobacco Use    Smoking status: Never    Smokeless tobacco: Never   Vaping Use    Vaping Use: Never used   Substance and Sexual Activity    Alcohol use: No     Alcohol/week: 0.0 oz    Drug use: No    Sexual activity: Yes     Partners: Male     Comment: , 2 kids   Other Topics Concern    Not on file   Social History Narrative    Working with [employer] for 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     Social Determinants of Health     Financial Resource Strain: Not on file   Food Insecurity: Not on file   Transportation Needs: Not on file   Physical Activity: Not on file   Stress: Not on file   Social Connections: Not on file   Intimate Partner Violence: Not on file   Housing Stability: Not on file     Family History   Problem Relation Age of Onset    Cancer Mother         breast/ 60s    Diabetes Mother     Psychiatric Illness Mother         depression / anxiety    Hypertension Mother     Heart Disease Mother     Heart Disease Father     Cancer Father         prostate    Heart Disease Maternal Grandmother     Heart Disease Maternal Grandfather      Recent Labs     12/14/22  1019   ALBUMIN 5.0*   HDL 85   TRIGLYCERIDE 57   SODIUM 140   POTASSIUM 3.2*   CHLORIDE 102   CO2 26   BUN 31*   CREATININE 1.72*                             Assessment & Plan        1. Stage 3b chronic kidney disease (HCC)  Stable overall, etiology is possibly secondary to NSAID use  No uremic symptoms  Renal dose of medication  Avoid nephrotoxins  Continue same medication regimen  Patient was advised to call us if symptoms worsen   Consider a kidney biopsy  2. Hypokalemia  No clear etiology  Possibly secondary to low caloric intake  There is also possibility of Bartter/Gettleman syndrome  Repeat labs including urine electrolytes  Repeat renin/aldosterone  Continue potassium supplement  Start spironolactone, patient was advised about the potential side effects  Recheck labs

## 2023-05-04 ENCOUNTER — HOSPITAL ENCOUNTER (OUTPATIENT)
Dept: LAB | Facility: MEDICAL CENTER | Age: 53
End: 2023-05-04
Attending: INTERNAL MEDICINE
Payer: COMMERCIAL

## 2023-05-04 DIAGNOSIS — N18.32 STAGE 3B CHRONIC KIDNEY DISEASE: ICD-10-CM

## 2023-05-04 DIAGNOSIS — E87.6 HYPOKALEMIA: ICD-10-CM

## 2023-05-04 LAB
ANION GAP SERPL CALC-SCNC: 13 MMOL/L (ref 7–16)
BUN SERPL-MCNC: 33 MG/DL (ref 8–22)
CALCIUM SERPL-MCNC: 9.8 MG/DL (ref 8.5–10.5)
CHLORIDE SERPL-SCNC: 99 MMOL/L (ref 96–112)
CHLORIDE UR-SCNC: <20 MMOL/L
CO2 SERPL-SCNC: 26 MMOL/L (ref 20–33)
CREAT SERPL-MCNC: 1.45 MG/DL (ref 0.5–1.4)
CREAT UR-MCNC: 65.19 MG/DL
ERYTHROCYTE [DISTWIDTH] IN BLOOD BY AUTOMATED COUNT: 45.9 FL (ref 35.9–50)
GFR SERPLBLD CREATININE-BSD FMLA CKD-EPI: 43 ML/MIN/1.73 M 2
GLUCOSE SERPL-MCNC: 95 MG/DL (ref 65–99)
HCT VFR BLD AUTO: 47.6 % (ref 37–47)
HGB BLD-MCNC: 15.4 G/DL (ref 12–16)
MCH RBC QN AUTO: 31.9 PG (ref 27–33)
MCHC RBC AUTO-ENTMCNC: 32.4 G/DL (ref 33.6–35)
MCV RBC AUTO: 98.6 FL (ref 81.4–97.8)
MICROALBUMIN UR-MCNC: 6.3 MG/DL
MICROALBUMIN/CREAT UR: 97 MG/G (ref 0–30)
PLATELET # BLD AUTO: 286 K/UL (ref 164–446)
PMV BLD AUTO: 10.6 FL (ref 9–12.9)
POTASSIUM SERPL-SCNC: 4 MMOL/L (ref 3.6–5.5)
POTASSIUM UR-SCNC: 27 MMOL/L
RBC # BLD AUTO: 4.83 M/UL (ref 4.2–5.4)
SODIUM SERPL-SCNC: 138 MMOL/L (ref 135–145)
SODIUM UR-SCNC: <20 MMOL/L
WBC # BLD AUTO: 6.3 K/UL (ref 4.8–10.8)

## 2023-05-04 PROCEDURE — 82043 UR ALBUMIN QUANTITATIVE: CPT

## 2023-05-04 PROCEDURE — 84244 ASSAY OF RENIN: CPT

## 2023-05-04 PROCEDURE — 80048 BASIC METABOLIC PNL TOTAL CA: CPT

## 2023-05-04 PROCEDURE — 82088 ASSAY OF ALDOSTERONE: CPT

## 2023-05-04 PROCEDURE — 85027 COMPLETE CBC AUTOMATED: CPT

## 2023-05-04 PROCEDURE — 84300 ASSAY OF URINE SODIUM: CPT

## 2023-05-04 PROCEDURE — 36415 COLL VENOUS BLD VENIPUNCTURE: CPT

## 2023-05-04 PROCEDURE — 84133 ASSAY OF URINE POTASSIUM: CPT

## 2023-05-04 PROCEDURE — 82570 ASSAY OF URINE CREATININE: CPT

## 2023-05-04 PROCEDURE — 82436 ASSAY OF URINE CHLORIDE: CPT

## 2023-05-07 LAB — ALDOST SERPL-MCNC: 151 NG/DL

## 2023-05-10 ENCOUNTER — OFFICE VISIT (OUTPATIENT)
Dept: NEPHROLOGY | Facility: MEDICAL CENTER | Age: 53
End: 2023-05-10
Payer: COMMERCIAL

## 2023-05-10 VITALS
WEIGHT: 106 LBS | DIASTOLIC BLOOD PRESSURE: 64 MMHG | OXYGEN SATURATION: 99 % | SYSTOLIC BLOOD PRESSURE: 104 MMHG | HEIGHT: 72 IN | BODY MASS INDEX: 14.36 KG/M2 | HEART RATE: 66 BPM | RESPIRATION RATE: 18 BRPM | TEMPERATURE: 98.6 F

## 2023-05-10 DIAGNOSIS — N18.32 STAGE 3B CHRONIC KIDNEY DISEASE: ICD-10-CM

## 2023-05-10 DIAGNOSIS — E87.6 HYPOKALEMIA: ICD-10-CM

## 2023-05-10 PROCEDURE — 99214 OFFICE O/P EST MOD 30 MIN: CPT | Performed by: INTERNAL MEDICINE

## 2023-05-10 ASSESSMENT — ENCOUNTER SYMPTOMS
NAUSEA: 0
COUGH: 0
SHORTNESS OF BREATH: 0
VOMITING: 0
CHILLS: 0
FEVER: 0

## 2023-05-10 ASSESSMENT — FIBROSIS 4 INDEX: FIB4 SCORE: 1.16

## 2023-05-10 NOTE — PROGRESS NOTES
Subjective     Lilian Nielson is a 52 y.o. female who presents with Chronic Kidney Disease            Patient has a history of hypokalemia, recently started on spironolactone, repeat labs are better  Patient has no hematuria, no dysuria.  No recent use of NSAIDs or IV contrast exposure.    Chronic Kidney Disease  This is a chronic problem. The current episode started more than 1 year ago. The problem occurs constantly. The problem has been waxing and waning. Pertinent negatives include no chest pain, chills, coughing, fever, nausea, urinary symptoms or vomiting.       Review of Systems   Constitutional:  Negative for chills, fever and malaise/fatigue.   Respiratory:  Negative for cough and shortness of breath.    Cardiovascular:  Negative for chest pain and leg swelling.   Gastrointestinal:  Negative for nausea and vomiting.   Genitourinary:  Negative for dysuria, frequency and urgency.              Objective     /64 (BP Location: Right arm, Patient Position: Sitting, BP Cuff Size: Adult)   Pulse 66   Temp 37 °C (98.6 °F) (Temporal)   Resp 18   Ht 1.829 m (6')   Wt 48.1 kg (106 lb)   LMP 04/25/2019 (Within Days)   SpO2 99%   BMI 14.38 kg/m²      Physical Exam  Vitals and nursing note reviewed.   Constitutional:       General: She is not in acute distress.     Appearance: Normal appearance. She is well-developed. She is not diaphoretic.   HENT:      Head: Normocephalic and atraumatic.      Right Ear: External ear normal.      Left Ear: External ear normal.      Nose: Nose normal.   Eyes:      General: No scleral icterus.        Right eye: No discharge.         Left eye: No discharge.      Conjunctiva/sclera: Conjunctivae normal.   Cardiovascular:      Rate and Rhythm: Normal rate and regular rhythm.      Heart sounds: No murmur heard.  Pulmonary:      Effort: Pulmonary effort is normal. No respiratory distress.      Breath sounds: Normal breath sounds.   Musculoskeletal:         General: No tenderness.       Right lower leg: No edema.      Left lower leg: No edema.   Skin:     General: Skin is warm and dry.      Findings: No erythema.   Neurological:      General: No focal deficit present.      Mental Status: She is alert and oriented to person, place, and time.      Cranial Nerves: No cranial nerve deficit.   Psychiatric:         Mood and Affect: Mood normal.         Behavior: Behavior normal.       Past Medical History:   Diagnosis Date    Amenorrhea     X 9yrs    Migraine 2011     Social History     Socioeconomic History    Marital status:      Spouse name: Not on file    Number of children: Not on file    Years of education: Not on file    Highest education level: Not on file   Occupational History    Not on file   Tobacco Use    Smoking status: Never    Smokeless tobacco: Never   Vaping Use    Vaping Use: Never used   Substance and Sexual Activity    Alcohol use: No     Alcohol/week: 0.0 oz    Drug use: No    Sexual activity: Yes     Partners: Male     Comment: , 2 kids   Other Topics Concern    Not on file   Social History Narrative    Working 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     Social Determinants of Health     Financial Resource Strain: Not on file   Food Insecurity: Not on file   Transportation Needs: Not on file   Physical Activity: Not on file   Stress: Not on file   Social Connections: Not on file   Intimate Partner Violence: Not on file   Housing Stability: Not on file     Family History   Problem Relation Age of Onset    Cancer Mother         breast/ 60s    Diabetes Mother     Psychiatric Illness Mother         depression / anxiety    Hypertension Mother     Heart Disease Mother     Heart Disease Father     Cancer Father         prostate    Heart Disease Maternal Grandmother     Heart Disease Maternal Grandfather      Recent Labs     12/14/22  1019 05/04/23  0827   ALBUMIN 5.0*  --    HDL 85  --    TRIGLYCERIDE 57  --     SODIUM 140 138   POTASSIUM 3.2* 4.0   CHLORIDE 102 99   CO2 26 26   BUN 31* 33*   CREATININE 1.72* 1.45*                             Assessment & Plan        1. Stage 3b chronic kidney disease (HCC)  Stable  No uremic symptoms  Renal dose of medication  Avoid nephrotoxins  Continue same medication regimen  Patient was advised to call us if symptoms worsen      2. Hypokalemia  Improved with spironolactone  Continue high potassium supplement  I did discuss with the patient the option of decreasing her potassium supplement and follow-up labs

## 2023-05-14 LAB — RENIN PLAS-CCNC: 4.2 NG/ML/HR

## 2023-10-02 ENCOUNTER — HOSPITAL ENCOUNTER (OUTPATIENT)
Dept: LAB | Facility: MEDICAL CENTER | Age: 53
End: 2023-10-02
Attending: INTERNAL MEDICINE
Payer: COMMERCIAL

## 2023-10-02 ENCOUNTER — HOSPITAL ENCOUNTER (OUTPATIENT)
Facility: MEDICAL CENTER | Age: 53
End: 2023-10-02
Attending: STUDENT IN AN ORGANIZED HEALTH CARE EDUCATION/TRAINING PROGRAM
Payer: COMMERCIAL

## 2023-10-02 ENCOUNTER — GYNECOLOGY VISIT (OUTPATIENT)
Dept: OBGYN | Facility: CLINIC | Age: 53
End: 2023-10-02
Payer: COMMERCIAL

## 2023-10-02 VITALS
WEIGHT: 112 LBS | SYSTOLIC BLOOD PRESSURE: 99 MMHG | DIASTOLIC BLOOD PRESSURE: 54 MMHG | HEIGHT: 71 IN | BODY MASS INDEX: 15.68 KG/M2

## 2023-10-02 DIAGNOSIS — E87.6 HYPOKALEMIA: ICD-10-CM

## 2023-10-02 DIAGNOSIS — Z12.4 SCREENING FOR MALIGNANT NEOPLASM OF CERVIX: ICD-10-CM

## 2023-10-02 DIAGNOSIS — Z01.419 ENCOUNTER FOR WELL WOMAN EXAM: ICD-10-CM

## 2023-10-02 DIAGNOSIS — N18.32 STAGE 3B CHRONIC KIDNEY DISEASE: ICD-10-CM

## 2023-10-02 DIAGNOSIS — N94.10 DYSPAREUNIA, FEMALE: ICD-10-CM

## 2023-10-02 DIAGNOSIS — N89.8 VAGINAL DRYNESS: ICD-10-CM

## 2023-10-02 DIAGNOSIS — Z12.39 ENCOUNTER FOR BREAST CANCER SCREENING USING NON-MAMMOGRAM MODALITY: ICD-10-CM

## 2023-10-02 LAB
ANION GAP SERPL CALC-SCNC: 12 MMOL/L (ref 7–16)
BUN SERPL-MCNC: 33 MG/DL (ref 8–22)
CALCIUM SERPL-MCNC: 9.9 MG/DL (ref 8.5–10.5)
CHLORIDE SERPL-SCNC: 105 MMOL/L (ref 96–112)
CO2 SERPL-SCNC: 25 MMOL/L (ref 20–33)
CREAT SERPL-MCNC: 1.6 MG/DL (ref 0.5–1.4)
GFR SERPLBLD CREATININE-BSD FMLA CKD-EPI: 38 ML/MIN/1.73 M 2
GLUCOSE SERPL-MCNC: 80 MG/DL (ref 65–99)
POTASSIUM SERPL-SCNC: 4 MMOL/L (ref 3.6–5.5)
SODIUM SERPL-SCNC: 142 MMOL/L (ref 135–145)

## 2023-10-02 PROCEDURE — 87591 N.GONORRHOEAE DNA AMP PROB: CPT

## 2023-10-02 PROCEDURE — 3074F SYST BP LT 130 MM HG: CPT | Performed by: STUDENT IN AN ORGANIZED HEALTH CARE EDUCATION/TRAINING PROGRAM

## 2023-10-02 PROCEDURE — 87624 HPV HI-RISK TYP POOLED RSLT: CPT

## 2023-10-02 PROCEDURE — 88175 CYTOPATH C/V AUTO FLUID REDO: CPT

## 2023-10-02 PROCEDURE — 36415 COLL VENOUS BLD VENIPUNCTURE: CPT

## 2023-10-02 PROCEDURE — 87625 HPV TYPES 16 & 18 ONLY: CPT

## 2023-10-02 PROCEDURE — 3078F DIAST BP <80 MM HG: CPT | Performed by: STUDENT IN AN ORGANIZED HEALTH CARE EDUCATION/TRAINING PROGRAM

## 2023-10-02 PROCEDURE — 80048 BASIC METABOLIC PNL TOTAL CA: CPT

## 2023-10-02 PROCEDURE — 99386 PREV VISIT NEW AGE 40-64: CPT | Performed by: STUDENT IN AN ORGANIZED HEALTH CARE EDUCATION/TRAINING PROGRAM

## 2023-10-02 PROCEDURE — 87491 CHLMYD TRACH DNA AMP PROBE: CPT

## 2023-10-02 ASSESSMENT — FIBROSIS 4 INDEX: FIB4 SCORE: 1.16

## 2023-10-02 NOTE — NON-PROVIDER
NP/ est care with pap  CC postmenopausal questions/ dryness and pain with intercourse  PHONE # VERIFIED  PHARMACY VERIFIED

## 2023-10-02 NOTE — PROGRESS NOTES
ANNUAL GYNECOLOGY VISIT    Chief Complaint  Annual Exam  New Patient (Est care with pap/)      Subjective  Lilian Nielson is a 52 y.o. female  No LMP recorded (lmp unknown). Patient is postmenopausal. She believes that she is menopausal. States that she has not had a menstrual period since . She presents today for Annual Exam. Overall, she is doing well. She does complain of vaginal dryness and pain with intercourse. She has noticed hot flashes x 4 months as well as 6 months of vaginal dryness. She has not tried anything for this. Last menstrual period was in . Mother went through menopause 49 or 50.     Preventive Care   Immunization History   Administered Date(s) Administered    INFLUENZA TIV (IM) 10/24/2012, 10/22/2013, 2016    Influenza (IM) Preservative Free - HISTORICAL DATA 2011, 2021    Influenza Seasonal Injectable - Historical Data 10/22/2013    Influenza Vac Subunit Quad Inj (Pf) 2019    Influenza Vaccine H1N1 - HISTORICAL DATA 2009    Influenza Vaccine Pediatric Split - Historical Data 10/22/2013, 2016    Influenza Vaccine Quad Inj (Pf) 2011, 10/13/2014, 10/07/2017, 2018, 2019, 2020    Influenza Vaccine Quad Inj (Preserved) 2015    Influenza, Unspecified - HISTORICAL DATA 2021, 2022, 2022, 2023    PFIZER BIVALENT SARS-COV-2 VACCINE (12+) 2022    PFIZER PURPLE CAP SARS-COV-2 VACCINATION (12+) 2020, 2021, 12/15/2021    TD Vaccine 2022    Tdap Vaccine 2012    Tuberculin Skin Test 10/15/2012, 10/22/2012, 2013    Zoster Vaccine Recombinant (RZV) (SHINGRIX) 2021, 09/15/2021     Last Pap:  and normal  Any abnormal pap smears?  no  Last Mammogram: 2023  Last Colonoscopy: cologuard last year  Last DEXA: n/a      Gynecology History  Current Sexual Activity: yes - with male partners  Currently in a relationship: yes - with   Feel safe in your current  relationship: yes  History of sexually transmitted diseases? no  Abnormal discharge? no  Menopause: yes - unsure of   Postmenopausal bleeding: no    Menstrual History  No LMP recorded (lmp unknown). Patient is postmenopausal.  Contraception  Current: none  Past: BCP      Cancer Risk Assessement:  Family history of:   - Breast cancer: mother 58. Did genetic testing and negative   - Ovarian cancer: no   - Uterine cancer: no   - Colon cancer: no    Obstetric History  OB History    Para Term  AB Living   2 2 2     2   SAB IAB Ectopic Molar Multiple Live Births             2      # Outcome Date GA Lbr Nader/2nd Weight Sex Delivery Anes PTL Lv   2 Term 04 39w0d  6 lb 7 oz F Vag-Spont   [place]   1 Term 00 40w0d  7 lb M Vag-Spont   [place]       Past Medical History  Past Medical History:   Diagnosis Date    Amenorrhea     X 9yrs    Kidney failure     Migraine 2011       Past Surgical History  Past Surgical History:   Procedure Laterality Date    TONSILLECTOMY  1971       Social History  Social History     Tobacco Use    Smoking status: Never    Smokeless tobacco: Never   Vaping Use    Vaping Use: Never used   Substance Use Topics    Alcohol use: Yes     Alcohol/week: 0.6 oz     Types: 1 Standard drinks or equivalent per week     Comment: 1 drink per week    Drug use: No        Family History  Family History   Problem Relation Age of Onset    Cancer Mother         breast/ 60s    Diabetes Mother     Psychiatric Illness Mother         depression / anxiety    Hypertension Mother     Heart Disease Mother     Heart Disease Father     Cancer Father         prostate    Heart Disease Maternal Grandmother     Heart Disease Maternal Grandfather        Home Medications  Current Outpatient Medications   Medication Sig    spironolactone (ALDACTONE) 25 MG Tab Take 1 Tablet by mouth every day.    SUMAtriptan (IMITREX) 50 MG Tab TAKE 1 TAB BY MOUTH ONCE DAILY AS NEEDED FOR MIGRAINE    potassium Chloride ER (K-TAB) 20  "MEQ Tab CR tablet Take 2 Tablets by mouth 3 times a day.    fluoxetine (PROZAC) 40 MG capsule TAKE 1 CAPSULE BY MOUTH EVERY DAY.    methocarbamol (ROBAXIN) 500 MG Tab TAKE 1 TABLET BY MOUTH 2 TIMES A DAY AS NEEDED       Allergies/Reactions  Allergies   Allergen Reactions    Codeine Rash and Vomiting     Rash and vomiting       ROS  Review of Systems:  Gen: no fevers or chills, no significant weight loss or gain  Respiratory:  no cough or dyspnea  Cardiac:  no chest pain, no palpitations, no syncope  GI:  no heartburn, no abdominal pain, no nausea or vomiting  Psych: no depression or anxiety    Objective  BP 99/54 (BP Location: Left arm, Patient Position: Sitting, BP Cuff Size: Adult)   Ht 5' 11\"   Wt 112 lb   LMP  (LMP Unknown) Comment: 2004 last menstrual period  BMI 15.62 kg/m²     Constitutional: The patient is well developed and well nourished.  Psychiatric: Patient is oriented to time place and person.   Skin: No rash observed.  Neck: Appears symmetric. Thyroid normal size  Respiratory: normal effort  Breast: Inspection reveals no asymetry or nipple discharge, no skin thickening, dimpling or erythema.  Palpation demonstrates no masses.  Abdomen: Soft, non-tender.  Pelvic Exam:      Vulva: external female genitalia are normal in appearance. No lesions. Mild atrophy noticed.     Urethra - no lesions, no erythema     Vagina: moist, pink, normal ruggae     Cervix: pink, smooth, no lesions, no CMT     Uterus - non-tender, normal size, shape, contour, mobile, anteverted     Ovaries: non-tender, no appreciable masses   Pap Smear performed: Yes   Chaperone Present: Terese Martinez MA  Extremities: Legs are symmetric and without tenderness. There is no edema present.      Assessment & Plan  Lilian Nielson is a 52 y.o. female who presents today for Annual Gyn Exam.     1. Health Maintenance/Encounter for well woman exam  PAP: - THINPREP PAP W/HPV AND CTNG; Future  STI Screen (HIV, Syphilis, Chlamydia / " Gonorrhea): declined  MAMMOGRAM: UTD  COLONOSCOPY: UTD-cologuard  DEXA: n/a  IMMUNIZATIONS: all desired UTD  TOBACCO: declines  COUNSELING: breast self exam and mammography screening    2. Screening for malignant neoplasm of cervix  - THINPREP PAP W/HPV AND CTNG; Future    3. Encounter for breast cancer screening using non-mammogram modality  Mammogram UTD. She does self breast exams when she showers    4. Vaginal dryness/Dyspareunia, female  Her exam confirms vaginal atrophy / genitorurinary syndrome of menopause. This is very common and due to low estrogen levels, which render the vaginal tissue thin, irritated, and open to colonization with gut janina. This can lead to irritation, dryness, painful sex, urinary infections and urinary urgency. Discussed risks, benefits, and indications for vaginal estrogen therapy. We also discussed the use of vaginal moisturizers and coconut oil. She would like to try the vaginal moisturizers and coconut oil first. If she is not having relief with these she will call me and we can discuss starting vaginal estrogen.       Return: Annually or PRN    Aurea Mejía P.A.-C.  10/2/2023

## 2023-10-06 LAB
C TRACH RRNA CVX QL NAA+PROBE: NEGATIVE
CYTOLOGIST CVX/VAG CYTO: ABNORMAL
CYTOLOGY CVX/VAG DOC CYTO: ABNORMAL
CYTOLOGY CVX/VAG DOC THIN PREP: ABNORMAL
HPV I/H RISK 4 DNA CVX QL PROBE+SIG AMP: POSITIVE
HPV16 DNA CVX QL PROBE+SIG AMP: NEGATIVE
HPV18+45 E6+E7 MRNA CVX QL NAA+PROBE: NEGATIVE
N GONORRHOEA RRNA CVX QL NAA+PROBE: NEGATIVE
NOTE NL11727A: ABNORMAL
OTHER STN SPEC: ABNORMAL
STAT OF ADQ CVX/VAG CYTO-IMP: ABNORMAL

## 2023-12-22 DIAGNOSIS — G43.109 MIGRAINE WITH AURA AND WITHOUT STATUS MIGRAINOSUS, NOT INTRACTABLE: ICD-10-CM

## 2023-12-24 NOTE — TELEPHONE ENCOUNTER
Received request via: Pharmacy    Was the patient seen in the last year in this department? Yes    Does the patient have an active prescription (recently filled or refills available) for medication(s) requested? YES    Does the patient have [facility] and need 100 day supply (blood pressure, diabetes and cholesterol meds only)? Patient does not have SCP    Requested Prescriptions     Pending Prescriptions Disp Refills    SUMAtriptan (IMITREX) 50 MG Tab [Pharmacy Med Name: SUMATRIPTAN SUCC 50 MG TABLET] 9 Tablet 11     Sig: TAKE 1 TAB BY MOUTH ONCE DAILY AS NEEDED FOR MIGRAINE

## 2023-12-26 RX ORDER — SUMATRIPTAN 50 MG/1
TABLET, FILM COATED ORAL
Qty: 9 TABLET | Refills: 11 | Status: SHIPPED | OUTPATIENT
Start: 2023-12-26

## 2023-12-29 DIAGNOSIS — F33.42 MAJOR DEPRESSIVE DISORDER, RECURRENT, IN FULL REMISSION (HCC): ICD-10-CM

## 2023-12-29 RX ORDER — FLUOXETINE HYDROCHLORIDE 40 MG/1
CAPSULE ORAL
Qty: 90 CAPSULE | Refills: 0 | Status: SHIPPED | OUTPATIENT
Start: 2023-12-29 | End: 2024-03-25 | Stop reason: SDUPTHER

## 2023-12-29 NOTE — TELEPHONE ENCOUNTER
Received request via: Pharmacy    Was the patient seen in the last year in this department? Yes    Does the patient have an active prescription (recently filled or refills available) for medication(s) requested?     Does the patient have [facility] and need 100 day supply (blood pressure, diabetes and cholesterol meds only)? Patient does not have SCP  Requested Prescriptions     Pending Prescriptions Disp Refills    fluoxetine (PROZAC) 40 MG capsule [Pharmacy Med Name: FLUOXETINE HCL 40 MG CAPSULE] 90 Capsule 3     Sig: TAKE 1 CAPSULE BY MOUTH EVERY DAY

## 2024-02-16 ENCOUNTER — PATIENT MESSAGE (OUTPATIENT)
Dept: HEALTH INFORMATION MANAGEMENT | Facility: OTHER | Age: 54
End: 2024-02-16

## 2024-03-25 ENCOUNTER — PATIENT MESSAGE (OUTPATIENT)
Dept: MEDICAL GROUP | Facility: MEDICAL CENTER | Age: 54
End: 2024-03-25
Payer: COMMERCIAL

## 2024-03-25 DIAGNOSIS — F33.42 MAJOR DEPRESSIVE DISORDER, RECURRENT, IN FULL REMISSION (HCC): ICD-10-CM

## 2024-03-25 NOTE — PATIENT COMMUNICATION
Received request via: Patient    Was the patient seen in the last year in this department? No    Does the patient have an active prescription (recently filled or refills available) for medication(s) requested? No    Pharm[facility] Name: [facility]    Does the patient have [facility] and need 100 day supply (blood pressure, diabetes and cholesterol meds only)? Patient does not have SCP

## 2024-03-26 RX ORDER — FLUOXETINE HYDROCHLORIDE 40 MG/1
CAPSULE ORAL
Qty: 60 CAPSULE | Refills: 0 | Status: SHIPPED | OUTPATIENT
Start: 2024-03-26

## 2024-05-03 ENCOUNTER — APPOINTMENT (OUTPATIENT)
Dept: MEDICAL GROUP | Facility: MEDICAL CENTER | Age: 54
End: 2024-05-03
Payer: COMMERCIAL

## 2024-05-14 ENCOUNTER — APPOINTMENT (OUTPATIENT)
Dept: MEDICAL GROUP | Facility: MEDICAL CENTER | Age: 54
End: 2024-05-14
Payer: COMMERCIAL

## 2024-05-14 ENCOUNTER — HOSPITAL ENCOUNTER (OUTPATIENT)
Dept: LAB | Facility: MEDICAL CENTER | Age: 54
End: 2024-05-14
Attending: INTERNAL MEDICINE
Payer: COMMERCIAL

## 2024-05-14 VITALS
HEART RATE: 61 BPM | WEIGHT: 107 LBS | HEIGHT: 72 IN | TEMPERATURE: 97 F | OXYGEN SATURATION: 97 % | BODY MASS INDEX: 14.49 KG/M2 | DIASTOLIC BLOOD PRESSURE: 60 MMHG | SYSTOLIC BLOOD PRESSURE: 104 MMHG

## 2024-05-14 DIAGNOSIS — Z12.31 ENCOUNTER FOR SCREENING MAMMOGRAM FOR MALIGNANT NEOPLASM OF BREAST: ICD-10-CM

## 2024-05-14 DIAGNOSIS — Z00.00 WELL ADULT EXAM: ICD-10-CM

## 2024-05-14 DIAGNOSIS — E87.6 HYPOKALEMIA: ICD-10-CM

## 2024-05-14 DIAGNOSIS — F33.42 MAJOR DEPRESSIVE DISORDER, RECURRENT, IN FULL REMISSION (HCC): ICD-10-CM

## 2024-05-14 DIAGNOSIS — E55.9 VITAMIN D INSUFFICIENCY: ICD-10-CM

## 2024-05-14 DIAGNOSIS — G43.109 MIGRAINE WITH AURA AND WITHOUT STATUS MIGRAINOSUS, NOT INTRACTABLE: ICD-10-CM

## 2024-05-14 DIAGNOSIS — N18.32 STAGE 3B CHRONIC KIDNEY DISEASE: ICD-10-CM

## 2024-05-14 PROBLEM — E61.1 IRON DEFICIENCY: Status: RESOLVED | Noted: 2018-04-26 | Resolved: 2024-05-14

## 2024-05-14 LAB
25(OH)D3 SERPL-MCNC: 82 NG/ML (ref 30–100)
ALBUMIN SERPL BCP-MCNC: 4.8 G/DL (ref 3.2–4.9)
ALBUMIN/GLOB SERPL: 2.1 G/DL
ALP SERPL-CCNC: 82 U/L (ref 30–99)
ALT SERPL-CCNC: 18 U/L (ref 2–50)
ANION GAP SERPL CALC-SCNC: 14 MMOL/L (ref 7–16)
AST SERPL-CCNC: 28 U/L (ref 12–45)
BASOPHILS # BLD AUTO: 1.1 % (ref 0–1.8)
BASOPHILS # BLD: 0.1 K/UL (ref 0–0.12)
BILIRUB SERPL-MCNC: 0.2 MG/DL (ref 0.1–1.5)
BUN SERPL-MCNC: 25 MG/DL (ref 8–22)
CALCIUM ALBUM COR SERPL-MCNC: 9.2 MG/DL (ref 8.5–10.5)
CALCIUM SERPL-MCNC: 9.8 MG/DL (ref 8.5–10.5)
CHLORIDE SERPL-SCNC: 102 MMOL/L (ref 96–112)
CHOLEST SERPL-MCNC: 194 MG/DL (ref 100–199)
CO2 SERPL-SCNC: 25 MMOL/L (ref 20–33)
CREAT SERPL-MCNC: 1.51 MG/DL (ref 0.5–1.4)
EOSINOPHIL # BLD AUTO: 0.35 K/UL (ref 0–0.51)
EOSINOPHIL NFR BLD: 4 % (ref 0–6.9)
ERYTHROCYTE [DISTWIDTH] IN BLOOD BY AUTOMATED COUNT: 45.3 FL (ref 35.9–50)
FASTING STATUS PATIENT QL REPORTED: NORMAL
GFR SERPLBLD CREATININE-BSD FMLA CKD-EPI: 41 ML/MIN/1.73 M 2
GLOBULIN SER CALC-MCNC: 2.3 G/DL (ref 1.9–3.5)
GLUCOSE SERPL-MCNC: 82 MG/DL (ref 65–99)
HCT VFR BLD AUTO: 47.3 % (ref 37–47)
HDLC SERPL-MCNC: 91 MG/DL
HGB BLD-MCNC: 15.7 G/DL (ref 12–16)
IMM GRANULOCYTES # BLD AUTO: 0.02 K/UL (ref 0–0.11)
IMM GRANULOCYTES NFR BLD AUTO: 0.2 % (ref 0–0.9)
LDLC SERPL CALC-MCNC: 93 MG/DL
LYMPHOCYTES # BLD AUTO: 1.39 K/UL (ref 1–4.8)
LYMPHOCYTES NFR BLD: 15.8 % (ref 22–41)
MCH RBC QN AUTO: 32.3 PG (ref 27–33)
MCHC RBC AUTO-ENTMCNC: 33.2 G/DL (ref 32.2–35.5)
MCV RBC AUTO: 97.3 FL (ref 81.4–97.8)
MONOCYTES # BLD AUTO: 0.56 K/UL (ref 0–0.85)
MONOCYTES NFR BLD AUTO: 6.4 % (ref 0–13.4)
NEUTROPHILS # BLD AUTO: 6.38 K/UL (ref 1.82–7.42)
NEUTROPHILS NFR BLD: 72.5 % (ref 44–72)
NRBC # BLD AUTO: 0 K/UL
NRBC BLD-RTO: 0 /100 WBC (ref 0–0.2)
PLATELET # BLD AUTO: 339 K/UL (ref 164–446)
PMV BLD AUTO: 9.5 FL (ref 9–12.9)
POTASSIUM SERPL-SCNC: 3.8 MMOL/L (ref 3.6–5.5)
PROT SERPL-MCNC: 7.1 G/DL (ref 6–8.2)
RBC # BLD AUTO: 4.86 M/UL (ref 4.2–5.4)
SODIUM SERPL-SCNC: 141 MMOL/L (ref 135–145)
TRIGL SERPL-MCNC: 49 MG/DL (ref 0–149)
TSH SERPL DL<=0.005 MIU/L-ACNC: 1.53 UIU/ML (ref 0.38–5.33)
WBC # BLD AUTO: 8.8 K/UL (ref 4.8–10.8)

## 2024-05-14 PROCEDURE — 3078F DIAST BP <80 MM HG: CPT | Performed by: INTERNAL MEDICINE

## 2024-05-14 PROCEDURE — 99214 OFFICE O/P EST MOD 30 MIN: CPT | Performed by: INTERNAL MEDICINE

## 2024-05-14 PROCEDURE — 3074F SYST BP LT 130 MM HG: CPT | Performed by: INTERNAL MEDICINE

## 2024-05-14 RX ORDER — SPIRONOLACTONE 25 MG/1
25 TABLET ORAL DAILY
Qty: 90 TABLET | Refills: 0 | Status: SHIPPED | OUTPATIENT
Start: 2024-05-14

## 2024-05-14 RX ORDER — SUMATRIPTAN 50 MG/1
TABLET, FILM COATED ORAL
Qty: 9 TABLET | Refills: 11 | Status: SHIPPED | OUTPATIENT
Start: 2024-05-14

## 2024-05-14 RX ORDER — FLUOXETINE HYDROCHLORIDE 40 MG/1
CAPSULE ORAL
Qty: 90 CAPSULE | Refills: 3 | Status: SHIPPED | OUTPATIENT
Start: 2024-05-14

## 2024-05-14 RX ORDER — POTASSIUM CHLORIDE 1500 MG/1
20 TABLET, EXTENDED RELEASE ORAL 2 TIMES DAILY
Qty: 180 TABLET | Refills: 0 | Status: SHIPPED | OUTPATIENT
Start: 2024-05-14

## 2024-05-14 ASSESSMENT — FIBROSIS 4 INDEX: FIB4 SCORE: 1.18

## 2024-05-14 NOTE — PROGRESS NOTES
Established Patient    Lilian presents today with the following:    CC: Medication refill for chronic medical problems    HPI:   Lilian is a 53 y.o. female who came in for above.    SHe saw nephrologist for hypokalemia and CKD last year.  She was started on spironolactone lowest dose.  She tolerated well and there is significant less need for potassium supplement, reduced from 40 mg tid to 20 mg bid.  Potassium level was normal last October.    She is generally doing well with no new concern.      ROS:   As above    Patient Active Problem List    Diagnosis Date Noted    Postmenopause 09/09/2020    RAYA (generalized anxiety disorder) 09/09/2020    Vitamin D insufficiency 08/20/2019    Eosinophilia 08/20/2019    Hypokalemia 08/20/2019    Underweight due to inadequate caloric intake 04/26/2018    CKD (chronic kidney disease) stage 3, GFR 30-59 ml/min 04/26/2018    Neck muscle spasm 10/26/2017    Major depressive disorder, recurrent, in full remission (HCC) 12/10/2015    Migraine with aura and without status migrainosus, not intractable 02/03/2014       Current Outpatient Medications   Medication Sig Dispense Refill    fluoxetine (PROZAC) 40 MG capsule TAKE 1 CAPSULE BY MOUTH EVERY DAY. 90 Capsule 3    potassium Chloride ER (K-TAB) 20 MEQ Tab CR tablet Take 1 Tablet by mouth 2 times a day. 180 Tablet 0    SUMAtriptan (IMITREX) 50 MG Tab TAKE 1 TAB BY MOUTH ONCE DAILY AS NEEDED FOR MIGRAINE 9 Tablet 11    spironolactone (ALDACTONE) 25 MG Tab Take 1 Tablet by mouth every day. 90 Tablet 0    methocarbamol (ROBAXIN) 500 MG Tab TAKE 1 TABLET BY MOUTH 2 TIMES A DAY AS NEEDED 60 Tablet 5     No current facility-administered medications for this visit.         /60   Pulse 61   Temp 36.1 °C (97 °F) (Temporal)   Ht 1.829 m (6')   Wt 48.5 kg (107 lb)   LMP  (LMP Unknown) Comment: 2004 last menstrual period  SpO2 97%   BMI 14.51 kg/m²     Physical Exam  General: Alert and oriented, No apparent distress.  Neck: Supple.  No cervical or supraclavicular lymphadenopathy noted. Thyroid not enlarged.  Lungs: Clear to auscultation bilaterally without any wheezing, crepitations.  Cardiovascular: Regular rate and rhythm. No murmurs, rubs or gallops.  Abdomen: Bowel sound +, soft, non tender, no rebound or guarding, no palpable organomegaly  Extremities: No edema.      Assessment and Plan    1. Stage 3b chronic kidney disease  Needs updated lab.  Encouraged to do labs soon.  - spironolactone (ALDACTONE) 25 MG Tab; Take 1 Tablet by mouth every day.  Dispense: 90 Tablet; Refill: 0    2. Hypokalemia  Needs updated lab.  Encouraged to do labs soon.  - potassium Chloride ER (K-TAB) 20 MEQ Tab CR tablet; Take 1 Tablet by mouth 2 times a day.  Dispense: 180 Tablet; Refill: 0  - spironolactone (ALDACTONE) 25 MG Tab; Take 1 Tablet by mouth every day.  Dispense: 90 Tablet; Refill: 0    3. Migraine with aura and without status migrainosus, not intractable  Stable with occasional use of sumatriptan.  - SUMAtriptan (IMITREX) 50 MG Tab; TAKE 1 TAB BY MOUTH ONCE DAILY AS NEEDED FOR MIGRAINE  Dispense: 9 Tablet; Refill: 11    4. Major depressive disorder, recurrent, in full remission (HCC)  Stable.  - fluoxetine (PROZAC) 40 MG capsule; TAKE 1 CAPSULE BY MOUTH EVERY DAY.  Dispense: 90 Capsule; Refill: 3    5. Well adult exam  - CBC WITH DIFFERENTIAL; Future  - Comp Metabolic Panel; Future  - Lipid Profile; Future  - TSH WITH REFLEX TO FT4; Future    6. Vitamin D insufficiency  - VITAMIN D,25 HYDROXY (DEFICIENCY); Future    7. Encounter for screening mammogram for malignant neoplasm of breast  - MA-SCREENING MAMMO BILAT W/TOMOSYNTHESIS W/CAD; Future        Return in about 1 year (around 5/14/2025) for Annual wellness visit.         Signed by: Shana Romo M.D.

## 2024-10-10 ENCOUNTER — PATIENT MESSAGE (OUTPATIENT)
Dept: HEALTH INFORMATION MANAGEMENT | Facility: OTHER | Age: 54
End: 2024-10-10

## 2024-11-20 DIAGNOSIS — N18.32 STAGE 3B CHRONIC KIDNEY DISEASE: ICD-10-CM

## 2024-11-20 DIAGNOSIS — E87.6 HYPOKALEMIA: ICD-10-CM

## 2024-11-20 DIAGNOSIS — G43.109 MIGRAINE WITH AURA AND WITHOUT STATUS MIGRAINOSUS, NOT INTRACTABLE: ICD-10-CM

## 2024-11-21 NOTE — TELEPHONE ENCOUNTER
Received request via: Pharmacy    Was the patient seen in the last year in this department? Yes    Does the patient have an active prescription (recently filled or refills available) for medication(s) requested? No    [facility]     Does the patient have [facility] and need 100-day supply? (This applies to ALL medications) Patient does not have SCP

## 2024-11-25 ENCOUNTER — HOSPITAL ENCOUNTER (OUTPATIENT)
Facility: MEDICAL CENTER | Age: 54
End: 2024-11-25
Attending: NURSE PRACTITIONER
Payer: COMMERCIAL

## 2024-11-25 LAB
25(OH)D3 SERPL-MCNC: 87 NG/ML (ref 30–100)
ALBUMIN SERPL BCP-MCNC: 4.8 G/DL (ref 3.2–4.9)
ALBUMIN/GLOB SERPL: 2.4 G/DL
ALP SERPL-CCNC: 74 U/L (ref 30–99)
ALT SERPL-CCNC: 20 U/L (ref 2–50)
ANION GAP SERPL CALC-SCNC: 17 MMOL/L (ref 7–16)
AST SERPL-CCNC: 29 U/L (ref 12–45)
BASOPHILS # BLD AUTO: 1.4 % (ref 0–1.8)
BASOPHILS # BLD: 0.1 K/UL (ref 0–0.12)
BILIRUB SERPL-MCNC: 0.3 MG/DL (ref 0.1–1.5)
BUN SERPL-MCNC: 26 MG/DL (ref 8–22)
CALCIUM ALBUM COR SERPL-MCNC: 9.3 MG/DL (ref 8.5–10.5)
CALCIUM SERPL-MCNC: 9.9 MG/DL (ref 8.5–10.5)
CHLORIDE SERPL-SCNC: 99 MMOL/L (ref 96–112)
CHOLEST SERPL-MCNC: 197 MG/DL (ref 100–199)
CO2 SERPL-SCNC: 26 MMOL/L (ref 20–33)
CREAT SERPL-MCNC: 1.49 MG/DL (ref 0.5–1.4)
EOSINOPHIL # BLD AUTO: 0.17 K/UL (ref 0–0.51)
EOSINOPHIL NFR BLD: 2.3 % (ref 0–6.9)
ERYTHROCYTE [DISTWIDTH] IN BLOOD BY AUTOMATED COUNT: 45.1 FL (ref 35.9–50)
EST. AVERAGE GLUCOSE BLD GHB EST-MCNC: 105 MG/DL
FOLATE SERPL-MCNC: >40 NG/ML
GFR SERPLBLD CREATININE-BSD FMLA CKD-EPI: 41 ML/MIN/1.73 M 2
GLOBULIN SER CALC-MCNC: 2 G/DL (ref 1.9–3.5)
GLUCOSE SERPL-MCNC: 91 MG/DL (ref 65–99)
HBA1C MFR BLD: 5.3 % (ref 4–5.6)
HCT VFR BLD AUTO: 47.4 % (ref 37–47)
HDLC SERPL-MCNC: 91 MG/DL
HGB BLD-MCNC: 15.8 G/DL (ref 12–16)
IMM GRANULOCYTES # BLD AUTO: 0.01 K/UL (ref 0–0.11)
IMM GRANULOCYTES NFR BLD AUTO: 0.1 % (ref 0–0.9)
LDLC SERPL CALC-MCNC: 94 MG/DL
LYMPHOCYTES # BLD AUTO: 1.28 K/UL (ref 1–4.8)
LYMPHOCYTES NFR BLD: 17.4 % (ref 22–41)
MCH RBC QN AUTO: 32.6 PG (ref 27–33)
MCHC RBC AUTO-ENTMCNC: 33.3 G/DL (ref 32.2–35.5)
MCV RBC AUTO: 97.7 FL (ref 81.4–97.8)
MONOCYTES # BLD AUTO: 0.66 K/UL (ref 0–0.85)
MONOCYTES NFR BLD AUTO: 9 % (ref 0–13.4)
NEUTROPHILS # BLD AUTO: 5.14 K/UL (ref 1.82–7.42)
NEUTROPHILS NFR BLD: 69.8 % (ref 44–72)
NRBC # BLD AUTO: 0 K/UL
NRBC BLD-RTO: 0 /100 WBC (ref 0–0.2)
PLATELET # BLD AUTO: 357 K/UL (ref 164–446)
PMV BLD AUTO: 9.5 FL (ref 9–12.9)
POTASSIUM SERPL-SCNC: 3.6 MMOL/L (ref 3.6–5.5)
PROT SERPL-MCNC: 6.8 G/DL (ref 6–8.2)
RBC # BLD AUTO: 4.85 M/UL (ref 4.2–5.4)
SODIUM SERPL-SCNC: 142 MMOL/L (ref 135–145)
T4 FREE SERPL-MCNC: 1.05 NG/DL (ref 0.93–1.7)
TRIGL SERPL-MCNC: 61 MG/DL (ref 0–149)
TSH SERPL-ACNC: 1.73 UIU/ML (ref 0.35–5.5)
VIT B12 SERPL-MCNC: 836 PG/ML (ref 211–911)
WBC # BLD AUTO: 7.4 K/UL (ref 4.8–10.8)

## 2024-11-25 PROCEDURE — 84439 ASSAY OF FREE THYROXINE: CPT

## 2024-11-25 PROCEDURE — 85025 COMPLETE CBC W/AUTO DIFF WBC: CPT

## 2024-11-25 PROCEDURE — 84443 ASSAY THYROID STIM HORMONE: CPT

## 2024-11-25 PROCEDURE — 82306 VITAMIN D 25 HYDROXY: CPT

## 2024-11-25 PROCEDURE — 80061 LIPID PANEL: CPT

## 2024-11-25 PROCEDURE — 82607 VITAMIN B-12: CPT

## 2024-11-25 PROCEDURE — 80053 COMPREHEN METABOLIC PANEL: CPT

## 2024-11-25 PROCEDURE — 82746 ASSAY OF FOLIC ACID SERUM: CPT

## 2024-11-25 PROCEDURE — 83036 HEMOGLOBIN GLYCOSYLATED A1C: CPT

## 2024-12-19 RX ORDER — SPIRONOLACTONE 25 MG/1
25 TABLET ORAL DAILY
Qty: 90 TABLET | Refills: 0 | Status: SHIPPED
Start: 2024-12-19

## 2024-12-19 RX ORDER — SUMATRIPTAN 50 MG/1
TABLET, FILM COATED ORAL
Qty: 9 TABLET | Refills: 2 | Status: SHIPPED | OUTPATIENT
Start: 2024-12-19

## 2024-12-23 ENCOUNTER — APPOINTMENT (OUTPATIENT)
Dept: MEDICAL GROUP | Age: 54
End: 2024-12-23
Payer: COMMERCIAL

## 2025-01-07 ENCOUNTER — HOSPITAL ENCOUNTER (OUTPATIENT)
Facility: MEDICAL CENTER | Age: 55
End: 2025-01-07
Attending: NURSE PRACTITIONER
Payer: COMMERCIAL

## 2025-01-07 LAB
ALBUMIN SERPL BCP-MCNC: 4.4 G/DL (ref 3.2–4.9)
ALBUMIN/GLOB SERPL: 2.3 G/DL
ALP SERPL-CCNC: 63 U/L (ref 30–99)
ALT SERPL-CCNC: 13 U/L (ref 2–50)
ANION GAP SERPL CALC-SCNC: 10 MMOL/L (ref 7–16)
AST SERPL-CCNC: 22 U/L (ref 12–45)
BILIRUB SERPL-MCNC: 0.2 MG/DL (ref 0.1–1.5)
BUN SERPL-MCNC: 30 MG/DL (ref 8–22)
CALCIUM ALBUM COR SERPL-MCNC: 9.1 MG/DL (ref 8.5–10.5)
CALCIUM SERPL-MCNC: 9.4 MG/DL (ref 8.5–10.5)
CHLORIDE SERPL-SCNC: 102 MMOL/L (ref 96–112)
CO2 SERPL-SCNC: 29 MMOL/L (ref 20–33)
CREAT SERPL-MCNC: 1.49 MG/DL (ref 0.5–1.4)
GFR SERPLBLD CREATININE-BSD FMLA CKD-EPI: 41 ML/MIN/1.73 M 2
GLOBULIN SER CALC-MCNC: 1.9 G/DL (ref 1.9–3.5)
GLUCOSE SERPL-MCNC: 88 MG/DL (ref 65–99)
POTASSIUM SERPL-SCNC: 4 MMOL/L (ref 3.6–5.5)
PROT SERPL-MCNC: 6.3 G/DL (ref 6–8.2)
SODIUM SERPL-SCNC: 141 MMOL/L (ref 135–145)

## 2025-01-07 PROCEDURE — 80053 COMPREHEN METABOLIC PANEL: CPT

## 2025-01-20 ENCOUNTER — HOSPITAL ENCOUNTER (OUTPATIENT)
Facility: MEDICAL CENTER | Age: 55
End: 2025-01-20
Attending: NURSE PRACTITIONER
Payer: COMMERCIAL

## 2025-01-20 LAB
ALBUMIN SERPL BCP-MCNC: 4.4 G/DL (ref 3.2–4.9)
ALBUMIN/GLOB SERPL: 2.3 G/DL
ALP SERPL-CCNC: 68 U/L (ref 30–99)
ALT SERPL-CCNC: 21 U/L (ref 2–50)
ANION GAP SERPL CALC-SCNC: 15 MMOL/L (ref 7–16)
AST SERPL-CCNC: 27 U/L (ref 12–45)
BILIRUB SERPL-MCNC: 0.3 MG/DL (ref 0.1–1.5)
BUN SERPL-MCNC: 26 MG/DL (ref 8–22)
CALCIUM ALBUM COR SERPL-MCNC: 9.3 MG/DL (ref 8.5–10.5)
CALCIUM SERPL-MCNC: 9.6 MG/DL (ref 8.5–10.5)
CHLORIDE SERPL-SCNC: 102 MMOL/L (ref 96–112)
CO2 SERPL-SCNC: 25 MMOL/L (ref 20–33)
CREAT SERPL-MCNC: 1.34 MG/DL (ref 0.5–1.4)
GFR SERPLBLD CREATININE-BSD FMLA CKD-EPI: 47 ML/MIN/1.73 M 2
GLOBULIN SER CALC-MCNC: 1.9 G/DL (ref 1.9–3.5)
GLUCOSE SERPL-MCNC: 83 MG/DL (ref 65–99)
POTASSIUM SERPL-SCNC: 3.4 MMOL/L (ref 3.6–5.5)
PROT SERPL-MCNC: 6.3 G/DL (ref 6–8.2)
SODIUM SERPL-SCNC: 142 MMOL/L (ref 135–145)

## 2025-01-20 PROCEDURE — 80053 COMPREHEN METABOLIC PANEL: CPT

## 2025-02-28 ENCOUNTER — RESEARCH ENCOUNTER (OUTPATIENT)
Dept: RESEARCH | Facility: MEDICAL CENTER | Age: 55
End: 2025-02-28

## 2025-02-28 DIAGNOSIS — Z00.6 CLINICAL TRIAL PARTICIPANT: ICD-10-CM

## 2025-02-28 DIAGNOSIS — Z00.6 RESEARCH STUDY PATIENT: Primary | ICD-10-CM

## 2025-03-17 ENCOUNTER — HOSPITAL ENCOUNTER (OUTPATIENT)
Dept: RADIOLOGY | Facility: MEDICAL CENTER | Age: 55
End: 2025-03-17
Attending: INTERNAL MEDICINE
Payer: COMMERCIAL

## 2025-03-17 ENCOUNTER — HOSPITAL ENCOUNTER (OUTPATIENT)
Dept: RADIOLOGY | Facility: MEDICAL CENTER | Age: 55
End: 2025-03-17
Attending: NURSE PRACTITIONER
Payer: COMMERCIAL

## 2025-03-17 DIAGNOSIS — Z12.31 ENCOUNTER FOR SCREENING MAMMOGRAM FOR MALIGNANT NEOPLASM OF BREAST: ICD-10-CM

## 2025-03-17 DIAGNOSIS — M81.0 SENILE OSTEOPOROSIS: ICD-10-CM

## 2025-03-17 PROCEDURE — 77067 SCR MAMMO BI INCL CAD: CPT

## 2025-03-17 PROCEDURE — 77080 DXA BONE DENSITY AXIAL: CPT

## 2025-03-25 ENCOUNTER — RESULTS FOLLOW-UP (OUTPATIENT)
Dept: INTERNAL MEDICINE | Facility: OTHER | Age: 55
End: 2025-03-25
Payer: COMMERCIAL

## 2025-04-07 ENCOUNTER — HOSPITAL ENCOUNTER (OUTPATIENT)
Facility: MEDICAL CENTER | Age: 55
End: 2025-04-07
Attending: NURSE PRACTITIONER
Payer: COMMERCIAL

## 2025-04-07 LAB — POTASSIUM SERPL-SCNC: 3.3 MMOL/L (ref 3.6–5.5)

## 2025-04-07 PROCEDURE — 84132 ASSAY OF SERUM POTASSIUM: CPT
